# Patient Record
Sex: MALE | Race: BLACK OR AFRICAN AMERICAN | Employment: UNEMPLOYED | URBAN - METROPOLITAN AREA
[De-identification: names, ages, dates, MRNs, and addresses within clinical notes are randomized per-mention and may not be internally consistent; named-entity substitution may affect disease eponyms.]

---

## 2017-05-02 ENCOUNTER — HOSPITAL ENCOUNTER (EMERGENCY)
Age: 32
Discharge: HOME | End: 2017-05-02
Attending: EMERGENCY MEDICINE
Payer: MEDICARE

## 2017-05-02 VITALS
SYSTOLIC BLOOD PRESSURE: 122 MMHG | HEART RATE: 79 BPM | TEMPERATURE: 97.2 F | RESPIRATION RATE: 17 BRPM | DIASTOLIC BLOOD PRESSURE: 67 MMHG | OXYGEN SATURATION: 98 %

## 2017-05-02 DIAGNOSIS — R09.89 CHEST CONGESTION: ICD-10-CM

## 2017-05-02 DIAGNOSIS — J45.901 ASTHMA EXACERBATION: Primary | ICD-10-CM

## 2017-05-02 LAB
POC TROPONIN I: 0.12 NG/ML (ref 0–0.1)
POC TROPONIN INTERP: ABNORMAL
TROPONIN INTERP: NORMAL
TROPONIN T: <0.03 NG/ML

## 2017-05-02 PROCEDURE — 84484 ASSAY OF TROPONIN QUANT: CPT

## 2017-05-02 PROCEDURE — 6370000000 HC RX 637 (ALT 250 FOR IP): Performed by: EMERGENCY MEDICINE

## 2017-05-02 PROCEDURE — 6360000002 HC RX W HCPCS: Performed by: EMERGENCY MEDICINE

## 2017-05-02 PROCEDURE — 94640 AIRWAY INHALATION TREATMENT: CPT

## 2017-05-02 PROCEDURE — 94664 DEMO&/EVAL PT USE INHALER: CPT

## 2017-05-02 PROCEDURE — 93005 ELECTROCARDIOGRAM TRACING: CPT

## 2017-05-02 PROCEDURE — 96374 THER/PROPH/DIAG INJ IV PUSH: CPT

## 2017-05-02 PROCEDURE — 99285 EMERGENCY DEPT VISIT HI MDM: CPT

## 2017-05-02 RX ORDER — AZITHROMYCIN 250 MG/1
500 TABLET, FILM COATED ORAL ONCE
Status: COMPLETED | OUTPATIENT
Start: 2017-05-02 | End: 2017-05-02

## 2017-05-02 RX ORDER — PREDNISONE 10 MG/1
TABLET ORAL
Qty: 20 TABLET | Refills: 0 | Status: SHIPPED | OUTPATIENT
Start: 2017-05-02 | End: 2017-05-12

## 2017-05-02 RX ORDER — AZITHROMYCIN 250 MG/1
TABLET, FILM COATED ORAL
Qty: 1 PACKET | Refills: 0 | Status: SHIPPED | OUTPATIENT
Start: 2017-05-02 | End: 2017-05-12

## 2017-05-02 RX ORDER — PREDNISONE 20 MG/1
40 TABLET ORAL ONCE
Status: DISCONTINUED | OUTPATIENT
Start: 2017-05-02 | End: 2017-05-02

## 2017-05-02 RX ORDER — ALBUTEROL SULFATE 90 UG/1
2 AEROSOL, METERED RESPIRATORY (INHALATION)
Status: DISCONTINUED | OUTPATIENT
Start: 2017-05-02 | End: 2017-05-03 | Stop reason: HOSPADM

## 2017-05-02 RX ORDER — METHYLPREDNISOLONE SODIUM SUCCINATE 125 MG/2ML
125 INJECTION, POWDER, LYOPHILIZED, FOR SOLUTION INTRAMUSCULAR; INTRAVENOUS ONCE
Status: COMPLETED | OUTPATIENT
Start: 2017-05-02 | End: 2017-05-02

## 2017-05-02 RX ADMIN — ALBUTEROL SULFATE 2 PUFF: 90 AEROSOL, METERED RESPIRATORY (INHALATION) at 21:20

## 2017-05-02 RX ADMIN — AZITHROMYCIN 500 MG: 250 TABLET, FILM COATED ORAL at 23:11

## 2017-05-02 RX ADMIN — ALBUTEROL SULFATE 15 MG: 5 SOLUTION RESPIRATORY (INHALATION) at 21:20

## 2017-05-02 RX ADMIN — IPRATROPIUM BROMIDE 0.5 MG: 0.5 SOLUTION RESPIRATORY (INHALATION) at 21:20

## 2017-05-02 RX ADMIN — METHYLPREDNISOLONE SODIUM SUCCINATE 125 MG: 125 INJECTION, POWDER, FOR SOLUTION INTRAMUSCULAR; INTRAVENOUS at 23:11

## 2017-05-02 ASSESSMENT — ENCOUNTER SYMPTOMS
WHEEZING: 1
STRIDOR: 0
ABDOMINAL PAIN: 0
RHINORRHEA: 0
NAUSEA: 0
VOMITING: 0
SHORTNESS OF BREATH: 1

## 2017-05-03 LAB
EKG ATRIAL RATE: 91 BPM
EKG P AXIS: 75 DEGREES
EKG P-R INTERVAL: 138 MS
EKG Q-T INTERVAL: 404 MS
EKG QRS DURATION: 98 MS
EKG QTC CALCULATION (BAZETT): 496 MS
EKG R AXIS: 67 DEGREES
EKG T AXIS: 100 DEGREES
EKG VENTRICULAR RATE: 91 BPM

## 2017-05-21 ENCOUNTER — HOSPITAL ENCOUNTER (EMERGENCY)
Age: 32
Discharge: HOME | End: 2017-05-22
Attending: EMERGENCY MEDICINE
Payer: MEDICARE

## 2017-05-21 VITALS
BODY MASS INDEX: 26.57 KG/M2 | SYSTOLIC BLOOD PRESSURE: 125 MMHG | RESPIRATION RATE: 20 BRPM | TEMPERATURE: 97.2 F | HEIGHT: 77 IN | WEIGHT: 225 LBS | HEART RATE: 94 BPM | OXYGEN SATURATION: 96 % | DIASTOLIC BLOOD PRESSURE: 75 MMHG

## 2017-05-21 DIAGNOSIS — F14.10 COCAINE ABUSE (HCC): ICD-10-CM

## 2017-05-21 DIAGNOSIS — J45.901 ASTHMA EXACERBATION: Primary | ICD-10-CM

## 2017-05-21 PROCEDURE — 99284 EMERGENCY DEPT VISIT MOD MDM: CPT

## 2017-05-21 RX ORDER — ALBUTEROL SULFATE 90 UG/1
2 AEROSOL, METERED RESPIRATORY (INHALATION)
Status: DISCONTINUED | OUTPATIENT
Start: 2017-05-21 | End: 2017-05-22 | Stop reason: HOSPADM

## 2017-05-21 RX ORDER — IPRATROPIUM BROMIDE AND ALBUTEROL SULFATE 2.5; .5 MG/3ML; MG/3ML
1 SOLUTION RESPIRATORY (INHALATION)
Status: DISCONTINUED | OUTPATIENT
Start: 2017-05-21 | End: 2017-05-22 | Stop reason: HOSPADM

## 2017-05-21 RX ORDER — PREDNISONE 20 MG/1
60 TABLET ORAL ONCE
Status: COMPLETED | OUTPATIENT
Start: 2017-05-22 | End: 2017-05-22

## 2017-05-21 RX ORDER — ALBUTEROL SULFATE 2.5 MG/3ML
5 SOLUTION RESPIRATORY (INHALATION)
Status: DISCONTINUED | OUTPATIENT
Start: 2017-05-21 | End: 2017-05-22 | Stop reason: HOSPADM

## 2017-05-22 PROCEDURE — 6370000000 HC RX 637 (ALT 250 FOR IP): Performed by: EMERGENCY MEDICINE

## 2017-05-22 PROCEDURE — 6370000000 HC RX 637 (ALT 250 FOR IP)

## 2017-05-22 RX ORDER — PREDNISONE 50 MG/1
50 TABLET ORAL DAILY
Qty: 5 TABLET | Refills: 0 | Status: SHIPPED | OUTPATIENT
Start: 2017-05-22 | End: 2017-05-27

## 2017-05-22 RX ORDER — ALBUTEROL SULFATE 90 UG/1
1-2 AEROSOL, METERED RESPIRATORY (INHALATION) EVERY 4 HOURS PRN
Qty: 1 INHALER | Refills: 1 | Status: SHIPPED | OUTPATIENT
Start: 2017-05-22 | End: 2017-08-04

## 2017-05-22 RX ORDER — AZITHROMYCIN 250 MG/1
500 TABLET, FILM COATED ORAL ONCE
Status: COMPLETED | OUTPATIENT
Start: 2017-05-22 | End: 2017-05-22

## 2017-05-22 RX ORDER — PREDNISONE 20 MG/1
TABLET ORAL
Status: COMPLETED
Start: 2017-05-22 | End: 2017-05-22

## 2017-05-22 RX ORDER — AZITHROMYCIN 250 MG/1
250 TABLET, FILM COATED ORAL DAILY
Qty: 4 TABLET | Refills: 0 | Status: ON HOLD | OUTPATIENT
Start: 2017-05-22 | End: 2018-10-30 | Stop reason: HOSPADM

## 2017-05-22 RX ADMIN — AZITHROMYCIN 500 MG: 250 TABLET, FILM COATED ORAL at 00:14

## 2017-05-22 RX ADMIN — PREDNISONE 60 MG: 20 TABLET ORAL at 00:13

## 2017-05-22 ASSESSMENT — ENCOUNTER SYMPTOMS
DIARRHEA: 0
BLOOD IN STOOL: 0
ABDOMINAL PAIN: 0
SHORTNESS OF BREATH: 1
RHINORRHEA: 0
APNEA: 0
NAUSEA: 0
SORE THROAT: 0
CHEST TIGHTNESS: 0
BACK PAIN: 0
WHEEZING: 1
VOMITING: 0
COUGH: 1

## 2017-08-03 ENCOUNTER — APPOINTMENT (OUTPATIENT)
Dept: GENERAL RADIOLOGY | Age: 32
End: 2017-08-03
Payer: MEDICARE

## 2017-08-03 ENCOUNTER — HOSPITAL ENCOUNTER (OUTPATIENT)
Age: 32
Setting detail: OBSERVATION
Discharge: HOME | End: 2017-08-04
Attending: EMERGENCY MEDICINE | Admitting: EMERGENCY MEDICINE
Payer: MEDICARE

## 2017-08-03 DIAGNOSIS — R07.9 CHEST PAIN, UNSPECIFIED TYPE: Primary | ICD-10-CM

## 2017-08-03 DIAGNOSIS — N17.9 AKI (ACUTE KIDNEY INJURY) (HCC): ICD-10-CM

## 2017-08-03 LAB
ABSOLUTE EOS #: 0.3 K/UL (ref 0–0.4)
ABSOLUTE LYMPH #: 1.7 K/UL (ref 1–4.8)
ABSOLUTE MONO #: 0.4 K/UL (ref 0.1–1.2)
ANION GAP SERPL CALCULATED.3IONS-SCNC: 21 MMOL/L (ref 9–17)
BASOPHILS # BLD: 1 %
BASOPHILS ABSOLUTE: 0 K/UL (ref 0–0.2)
BNP INTERPRETATION: ABNORMAL
BUN BLDV-MCNC: 10 MG/DL (ref 6–20)
BUN/CREAT BLD: ABNORMAL (ref 9–20)
CALCIUM SERPL-MCNC: 8.8 MG/DL (ref 8.6–10.4)
CHLORIDE BLD-SCNC: 104 MMOL/L (ref 98–107)
CO2: 23 MMOL/L (ref 20–31)
CREAT SERPL-MCNC: 1.31 MG/DL (ref 0.7–1.2)
D-DIMER QUANTITATIVE: 0.24 MG/L FEU
DIFFERENTIAL TYPE: NORMAL
EOSINOPHILS RELATIVE PERCENT: 5 %
GFR AFRICAN AMERICAN: >60 ML/MIN
GFR NON-AFRICAN AMERICAN: >60 ML/MIN
GFR SERPL CREATININE-BSD FRML MDRD: ABNORMAL ML/MIN/{1.73_M2}
GFR SERPL CREATININE-BSD FRML MDRD: ABNORMAL ML/MIN/{1.73_M2}
GLUCOSE BLD-MCNC: 125 MG/DL (ref 70–99)
HCT VFR BLD CALC: 44.6 % (ref 41–53)
HEMOGLOBIN: 14.7 G/DL (ref 13.5–17.5)
LYMPHOCYTES # BLD: 33 %
MCH RBC QN AUTO: 29.9 PG (ref 26–34)
MCHC RBC AUTO-ENTMCNC: 33 G/DL (ref 31–37)
MCV RBC AUTO: 90.4 FL (ref 80–100)
MONOCYTES # BLD: 7 %
PDW BLD-RTO: 13.7 % (ref 12.5–15.4)
PLATELET # BLD: 202 K/UL (ref 140–450)
PLATELET ESTIMATE: NORMAL
PMV BLD AUTO: 10.3 FL (ref 6–12)
POC TROPONIN I: 0.09 NG/ML (ref 0–0.1)
POC TROPONIN INTERP: NORMAL
POTASSIUM SERPL-SCNC: 3.6 MMOL/L (ref 3.7–5.3)
PRO-BNP: 393 PG/ML
RBC # BLD: 4.94 M/UL (ref 4.5–5.9)
RBC # BLD: NORMAL 10*6/UL
SEG NEUTROPHILS: 54 %
SEGMENTED NEUTROPHILS ABSOLUTE COUNT: 2.8 K/UL (ref 1.8–7.7)
SODIUM BLD-SCNC: 148 MMOL/L (ref 135–144)
TROPONIN INTERP: NORMAL
TROPONIN T: <0.03 NG/ML
WBC # BLD: 5.2 K/UL (ref 3.5–11)
WBC # BLD: NORMAL 10*3/UL

## 2017-08-03 PROCEDURE — 94640 AIRWAY INHALATION TREATMENT: CPT

## 2017-08-03 PROCEDURE — 96365 THER/PROPH/DIAG IV INF INIT: CPT

## 2017-08-03 PROCEDURE — 93005 ELECTROCARDIOGRAM TRACING: CPT

## 2017-08-03 PROCEDURE — 36415 COLL VENOUS BLD VENIPUNCTURE: CPT

## 2017-08-03 PROCEDURE — 6370000000 HC RX 637 (ALT 250 FOR IP): Performed by: EMERGENCY MEDICINE

## 2017-08-03 PROCEDURE — 71010 XR CHEST PORTABLE: CPT

## 2017-08-03 PROCEDURE — 96366 THER/PROPH/DIAG IV INF ADDON: CPT

## 2017-08-03 PROCEDURE — 84484 ASSAY OF TROPONIN QUANT: CPT

## 2017-08-03 PROCEDURE — 96375 TX/PRO/DX INJ NEW DRUG ADDON: CPT

## 2017-08-03 PROCEDURE — 83880 ASSAY OF NATRIURETIC PEPTIDE: CPT

## 2017-08-03 PROCEDURE — 6360000002 HC RX W HCPCS: Performed by: EMERGENCY MEDICINE

## 2017-08-03 PROCEDURE — 85379 FIBRIN DEGRADATION QUANT: CPT

## 2017-08-03 PROCEDURE — 99285 EMERGENCY DEPT VISIT HI MDM: CPT

## 2017-08-03 PROCEDURE — 94761 N-INVAS EAR/PLS OXIMETRY MLT: CPT

## 2017-08-03 PROCEDURE — 94664 DEMO&/EVAL PT USE INHALER: CPT

## 2017-08-03 PROCEDURE — 85025 COMPLETE CBC W/AUTO DIFF WBC: CPT

## 2017-08-03 PROCEDURE — 80048 BASIC METABOLIC PNL TOTAL CA: CPT

## 2017-08-03 RX ORDER — ALBUTEROL SULFATE 90 UG/1
2 AEROSOL, METERED RESPIRATORY (INHALATION)
Status: DISCONTINUED | OUTPATIENT
Start: 2017-08-03 | End: 2017-08-04

## 2017-08-03 RX ORDER — METHYLPREDNISOLONE SODIUM SUCCINATE 125 MG/2ML
40 INJECTION, POWDER, LYOPHILIZED, FOR SOLUTION INTRAMUSCULAR; INTRAVENOUS ONCE
Status: COMPLETED | OUTPATIENT
Start: 2017-08-03 | End: 2017-08-03

## 2017-08-03 RX ORDER — MAGNESIUM SULFATE 1 G/100ML
2 INJECTION INTRAVENOUS ONCE
Status: COMPLETED | OUTPATIENT
Start: 2017-08-03 | End: 2017-08-04

## 2017-08-03 RX ADMIN — METHYLPREDNISOLONE SODIUM SUCCINATE 40 MG: 125 INJECTION, POWDER, FOR SOLUTION INTRAMUSCULAR; INTRAVENOUS at 22:22

## 2017-08-03 RX ADMIN — IPRATROPIUM BROMIDE 0.5 MG: 0.5 SOLUTION RESPIRATORY (INHALATION) at 22:05

## 2017-08-03 RX ADMIN — MAGNESIUM SULFATE IN DEXTROSE 2 G: 10 INJECTION, SOLUTION INTRAVENOUS at 22:23

## 2017-08-03 RX ADMIN — ALBUTEROL SULFATE 20 MG: 5 SOLUTION RESPIRATORY (INHALATION) at 22:05

## 2017-08-03 RX ADMIN — ALBUTEROL SULFATE 15 MG: 5 SOLUTION RESPIRATORY (INHALATION) at 23:07

## 2017-08-03 RX ADMIN — ALBUTEROL SULFATE 2 PUFF: 90 AEROSOL, METERED RESPIRATORY (INHALATION) at 22:05

## 2017-08-04 VITALS
OXYGEN SATURATION: 95 % | HEART RATE: 101 BPM | DIASTOLIC BLOOD PRESSURE: 78 MMHG | BODY MASS INDEX: 25.98 KG/M2 | RESPIRATION RATE: 20 BRPM | HEIGHT: 77 IN | WEIGHT: 220 LBS | TEMPERATURE: 97 F | SYSTOLIC BLOOD PRESSURE: 136 MMHG

## 2017-08-04 LAB
EKG ATRIAL RATE: 85 BPM
EKG ATRIAL RATE: 89 BPM
EKG P AXIS: 73 DEGREES
EKG P AXIS: 76 DEGREES
EKG P-R INTERVAL: 148 MS
EKG P-R INTERVAL: 152 MS
EKG Q-T INTERVAL: 392 MS
EKG Q-T INTERVAL: 414 MS
EKG QRS DURATION: 102 MS
EKG QRS DURATION: 102 MS
EKG QTC CALCULATION (BAZETT): 466 MS
EKG QTC CALCULATION (BAZETT): 503 MS
EKG R AXIS: 62 DEGREES
EKG R AXIS: 64 DEGREES
EKG T AXIS: 109 DEGREES
EKG T AXIS: 97 DEGREES
EKG VENTRICULAR RATE: 85 BPM
EKG VENTRICULAR RATE: 89 BPM
TROPONIN INTERP: NORMAL
TROPONIN INTERP: NORMAL
TROPONIN T: <0.03 NG/ML
TROPONIN T: <0.03 NG/ML

## 2017-08-04 PROCEDURE — 6370000000 HC RX 637 (ALT 250 FOR IP): Performed by: EMERGENCY MEDICINE

## 2017-08-04 PROCEDURE — 6360000002 HC RX W HCPCS: Performed by: STUDENT IN AN ORGANIZED HEALTH CARE EDUCATION/TRAINING PROGRAM

## 2017-08-04 PROCEDURE — 93005 ELECTROCARDIOGRAM TRACING: CPT

## 2017-08-04 PROCEDURE — G0378 HOSPITAL OBSERVATION PER HR: HCPCS

## 2017-08-04 PROCEDURE — 84484 ASSAY OF TROPONIN QUANT: CPT

## 2017-08-04 PROCEDURE — 6370000000 HC RX 637 (ALT 250 FOR IP): Performed by: STUDENT IN AN ORGANIZED HEALTH CARE EDUCATION/TRAINING PROGRAM

## 2017-08-04 PROCEDURE — 94640 AIRWAY INHALATION TREATMENT: CPT

## 2017-08-04 PROCEDURE — 36415 COLL VENOUS BLD VENIPUNCTURE: CPT

## 2017-08-04 PROCEDURE — 2580000003 HC RX 258: Performed by: EMERGENCY MEDICINE

## 2017-08-04 RX ORDER — PREDNISONE 50 MG/1
50 TABLET ORAL DAILY
Qty: 4 TABLET | Refills: 0 | Status: ON HOLD | OUTPATIENT
Start: 2017-08-04 | End: 2018-10-30 | Stop reason: HOSPADM

## 2017-08-04 RX ORDER — SODIUM CHLORIDE 0.9 % (FLUSH) 0.9 %
10 SYRINGE (ML) INJECTION EVERY 12 HOURS SCHEDULED
Status: DISCONTINUED | OUTPATIENT
Start: 2017-08-04 | End: 2017-08-04 | Stop reason: HOSPADM

## 2017-08-04 RX ORDER — ALBUTEROL SULFATE 2.5 MG/3ML
2.5 SOLUTION RESPIRATORY (INHALATION) EVERY 4 HOURS PRN
Status: DISCONTINUED | OUTPATIENT
Start: 2017-08-04 | End: 2017-08-04 | Stop reason: HOSPADM

## 2017-08-04 RX ORDER — SODIUM CHLORIDE 0.9 % (FLUSH) 0.9 %
10 SYRINGE (ML) INJECTION PRN
Status: DISCONTINUED | OUTPATIENT
Start: 2017-08-04 | End: 2017-08-04 | Stop reason: HOSPADM

## 2017-08-04 RX ORDER — ACETAMINOPHEN 325 MG/1
650 TABLET ORAL EVERY 4 HOURS PRN
Status: DISCONTINUED | OUTPATIENT
Start: 2017-08-04 | End: 2017-08-04 | Stop reason: HOSPADM

## 2017-08-04 RX ORDER — VERAPAMIL HYDROCHLORIDE 240 MG/1
240 TABLET, FILM COATED, EXTENDED RELEASE ORAL DAILY
Qty: 30 TABLET | Refills: 3 | Status: ON HOLD | OUTPATIENT
Start: 2017-08-04 | End: 2018-10-30

## 2017-08-04 RX ORDER — ALBUTEROL SULFATE 90 UG/1
2 AEROSOL, METERED RESPIRATORY (INHALATION) EVERY 6 HOURS PRN
Status: DISCONTINUED | OUTPATIENT
Start: 2017-08-04 | End: 2017-08-04 | Stop reason: HOSPADM

## 2017-08-04 RX ORDER — ASPIRIN 81 MG/1
81 TABLET, CHEWABLE ORAL DAILY
Qty: 30 TABLET | Refills: 0 | Status: ON HOLD | OUTPATIENT
Start: 2017-08-04 | End: 2018-10-30

## 2017-08-04 RX ORDER — VERAPAMIL HYDROCHLORIDE 240 MG/1
240 TABLET, FILM COATED, EXTENDED RELEASE ORAL DAILY
Status: DISCONTINUED | OUTPATIENT
Start: 2017-08-04 | End: 2017-08-04 | Stop reason: HOSPADM

## 2017-08-04 RX ORDER — GABAPENTIN 100 MG/1
200 CAPSULE ORAL NIGHTLY
Qty: 90 CAPSULE | Refills: 3 | Status: ON HOLD | OUTPATIENT
Start: 2017-08-04 | End: 2018-10-30 | Stop reason: HOSPADM

## 2017-08-04 RX ORDER — GABAPENTIN 100 MG/1
200 CAPSULE ORAL NIGHTLY
Status: DISCONTINUED | OUTPATIENT
Start: 2017-08-04 | End: 2017-08-04 | Stop reason: HOSPADM

## 2017-08-04 RX ORDER — ASPIRIN 81 MG/1
81 TABLET, CHEWABLE ORAL DAILY
Status: DISCONTINUED | OUTPATIENT
Start: 2017-08-04 | End: 2017-08-04 | Stop reason: HOSPADM

## 2017-08-04 RX ORDER — ASPIRIN 81 MG/1
81 TABLET, CHEWABLE ORAL DAILY
Qty: 30 TABLET | Refills: 3 | Status: ON HOLD | OUTPATIENT
Start: 2017-08-04 | End: 2017-11-21 | Stop reason: HOSPADM

## 2017-08-04 RX ORDER — 0.9 % SODIUM CHLORIDE 0.9 %
1000 INTRAVENOUS SOLUTION INTRAVENOUS ONCE
Status: COMPLETED | OUTPATIENT
Start: 2017-08-04 | End: 2017-08-04

## 2017-08-04 RX ORDER — ALBUTEROL SULFATE 90 UG/1
1-2 AEROSOL, METERED RESPIRATORY (INHALATION) EVERY 4 HOURS PRN
Qty: 1 INHALER | Refills: 1 | Status: SHIPPED | OUTPATIENT
Start: 2017-08-04 | End: 2018-11-05 | Stop reason: SDUPTHER

## 2017-08-04 RX ORDER — CETIRIZINE HYDROCHLORIDE 10 MG/1
10 TABLET ORAL DAILY
Qty: 30 TABLET | Refills: 0 | Status: ON HOLD | OUTPATIENT
Start: 2017-08-04 | End: 2018-10-30

## 2017-08-04 RX ADMIN — VERAPAMIL HYDROCHLORIDE 240 MG: 240 TABLET, FILM COATED, EXTENDED RELEASE ORAL at 09:54

## 2017-08-04 RX ADMIN — ASPIRIN 81 MG: 81 TABLET, CHEWABLE ORAL at 09:54

## 2017-08-04 RX ADMIN — ALBUTEROL SULFATE 2.5 MG: 2.5 SOLUTION RESPIRATORY (INHALATION) at 08:05

## 2017-08-04 RX ADMIN — SODIUM CHLORIDE 1000 ML: 9 INJECTION, SOLUTION INTRAVENOUS at 01:36

## 2017-08-04 RX ADMIN — BECLOMETHASONE DIPROPIONATE 2 PUFF: 40 AEROSOL, METERED RESPIRATORY (INHALATION) at 11:59

## 2017-08-04 ASSESSMENT — HEART SCORE: ECG: 1

## 2017-08-04 ASSESSMENT — ENCOUNTER SYMPTOMS
SHORTNESS OF BREATH: 1
COUGH: 1
NAUSEA: 0
VOMITING: 0
ABDOMINAL PAIN: 0

## 2017-11-21 ENCOUNTER — HOSPITAL ENCOUNTER (INPATIENT)
Age: 32
LOS: 1 days | Discharge: HOME | DRG: 198 | End: 2017-11-21
Attending: EMERGENCY MEDICINE | Admitting: INTERNAL MEDICINE
Payer: MEDICARE

## 2017-11-21 ENCOUNTER — APPOINTMENT (OUTPATIENT)
Dept: GENERAL RADIOLOGY | Age: 32
DRG: 198 | End: 2017-11-21
Payer: MEDICARE

## 2017-11-21 VITALS
TEMPERATURE: 98.1 F | OXYGEN SATURATION: 95 % | HEART RATE: 79 BPM | WEIGHT: 222 LBS | DIASTOLIC BLOOD PRESSURE: 73 MMHG | RESPIRATION RATE: 16 BRPM | SYSTOLIC BLOOD PRESSURE: 145 MMHG | BODY MASS INDEX: 26.33 KG/M2

## 2017-11-21 DIAGNOSIS — I42.1 HOCM (HYPERTROPHIC OBSTRUCTIVE CARDIOMYOPATHY) (HCC): ICD-10-CM

## 2017-11-21 DIAGNOSIS — F17.200 SMOKER: ICD-10-CM

## 2017-11-21 DIAGNOSIS — R07.9 CHEST PAIN, UNSPECIFIED TYPE: Primary | ICD-10-CM

## 2017-11-21 DIAGNOSIS — F14.10 NONDEPENDENT COCAINE ABUSE (HCC): ICD-10-CM

## 2017-11-21 PROBLEM — J45.20 MILD INTERMITTENT ASTHMA WITHOUT COMPLICATION: Status: ACTIVE | Noted: 2017-11-21

## 2017-11-21 PROBLEM — Z91.199 NONCOMPLIANCE: Status: ACTIVE | Noted: 2017-11-21

## 2017-11-21 LAB
ABSOLUTE EOS #: 0.24 K/UL (ref 0–0.4)
ABSOLUTE IMMATURE GRANULOCYTE: 0 K/UL (ref 0–0.3)
ABSOLUTE LYMPH #: 3.18 K/UL (ref 1–4.8)
ABSOLUTE MONO #: 0.47 K/UL (ref 0.1–0.8)
ANION GAP SERPL CALCULATED.3IONS-SCNC: 13 MMOL/L (ref 9–17)
BASOPHILS # BLD: 0 %
BASOPHILS ABSOLUTE: 0 K/UL (ref 0–0.2)
BUN BLDV-MCNC: 9 MG/DL (ref 6–20)
BUN/CREAT BLD: ABNORMAL (ref 9–20)
CALCIUM SERPL-MCNC: 9.1 MG/DL (ref 8.6–10.4)
CHLORIDE BLD-SCNC: 103 MMOL/L (ref 98–107)
CO2: 22 MMOL/L (ref 20–31)
CREAT SERPL-MCNC: 1.29 MG/DL (ref 0.7–1.2)
DIFFERENTIAL TYPE: ABNORMAL
EKG ATRIAL RATE: 85 BPM
EKG P AXIS: 75 DEGREES
EKG P-R INTERVAL: 146 MS
EKG Q-T INTERVAL: 424 MS
EKG QRS DURATION: 100 MS
EKG QTC CALCULATION (BAZETT): 504 MS
EKG R AXIS: 61 DEGREES
EKG T AXIS: 85 DEGREES
EKG VENTRICULAR RATE: 85 BPM
EOSINOPHILS RELATIVE PERCENT: 4 %
GFR AFRICAN AMERICAN: >60 ML/MIN
GFR NON-AFRICAN AMERICAN: >60 ML/MIN
GFR SERPL CREATININE-BSD FRML MDRD: ABNORMAL ML/MIN/{1.73_M2}
GFR SERPL CREATININE-BSD FRML MDRD: ABNORMAL ML/MIN/{1.73_M2}
GLUCOSE BLD-MCNC: 114 MG/DL (ref 70–99)
HCT VFR BLD CALC: 50.5 % (ref 40.7–50.3)
HEMOGLOBIN: 16.6 G/DL (ref 13–17)
IMMATURE GRANULOCYTES: 0 %
LYMPHOCYTES # BLD: 54 %
MAGNESIUM: 2 MG/DL (ref 1.6–2.6)
MCH RBC QN AUTO: 30.1 PG (ref 25.2–33.5)
MCHC RBC AUTO-ENTMCNC: 32.9 G/DL (ref 28.4–34.8)
MCV RBC AUTO: 91.5 FL (ref 82.6–102.9)
MONOCYTES # BLD: 8 %
MORPHOLOGY: NORMAL
PDW BLD-RTO: 12.5 % (ref 11.8–14.4)
PLATELET # BLD: ABNORMAL K/UL (ref 138–453)
PLATELET ESTIMATE: ABNORMAL
PLATELET, FLUORESCENCE: NORMAL K/UL (ref 138–453)
PLATELET, IMMATURE FRACTION: NORMAL % (ref 1.1–10.3)
PMV BLD AUTO: ABNORMAL FL (ref 8.1–13.5)
POC TROPONIN I: 0.08 NG/ML (ref 0–0.1)
POC TROPONIN I: 0.13 NG/ML (ref 0–0.1)
POC TROPONIN INTERP: ABNORMAL
POC TROPONIN INTERP: NORMAL
POTASSIUM SERPL-SCNC: 3.9 MMOL/L (ref 3.7–5.3)
RBC # BLD: 5.52 M/UL (ref 4.21–5.77)
RBC # BLD: ABNORMAL 10*6/UL
SEG NEUTROPHILS: 34 %
SEGMENTED NEUTROPHILS ABSOLUTE COUNT: 2.01 K/UL (ref 1.8–7.7)
SODIUM BLD-SCNC: 138 MMOL/L (ref 135–144)
TROPONIN INTERP: NORMAL
TROPONIN T: <0.03 NG/ML
WBC # BLD: 5.9 K/UL (ref 3.5–11.3)
WBC # BLD: ABNORMAL 10*3/UL

## 2017-11-21 PROCEDURE — 2060000000 HC ICU INTERMEDIATE R&B

## 2017-11-21 PROCEDURE — 6370000000 HC RX 637 (ALT 250 FOR IP): Performed by: NURSE PRACTITIONER

## 2017-11-21 PROCEDURE — 99285 EMERGENCY DEPT VISIT HI MDM: CPT

## 2017-11-21 PROCEDURE — 2580000003 HC RX 258: Performed by: EMERGENCY MEDICINE

## 2017-11-21 PROCEDURE — 94640 AIRWAY INHALATION TREATMENT: CPT

## 2017-11-21 PROCEDURE — 6360000002 HC RX W HCPCS: Performed by: NURSE PRACTITIONER

## 2017-11-21 PROCEDURE — 6360000002 HC RX W HCPCS: Performed by: EMERGENCY MEDICINE

## 2017-11-21 PROCEDURE — 6370000000 HC RX 637 (ALT 250 FOR IP): Performed by: EMERGENCY MEDICINE

## 2017-11-21 PROCEDURE — 84484 ASSAY OF TROPONIN QUANT: CPT

## 2017-11-21 PROCEDURE — 80048 BASIC METABOLIC PNL TOTAL CA: CPT

## 2017-11-21 PROCEDURE — 76937 US GUIDE VASCULAR ACCESS: CPT

## 2017-11-21 PROCEDURE — 85025 COMPLETE CBC W/AUTO DIFF WBC: CPT

## 2017-11-21 PROCEDURE — 2580000003 HC RX 258: Performed by: INTERNAL MEDICINE

## 2017-11-21 PROCEDURE — 71010 XR CHEST PORTABLE: CPT

## 2017-11-21 PROCEDURE — 94762 N-INVAS EAR/PLS OXIMTRY CONT: CPT

## 2017-11-21 PROCEDURE — 83735 ASSAY OF MAGNESIUM: CPT

## 2017-11-21 PROCEDURE — 36415 COLL VENOUS BLD VENIPUNCTURE: CPT

## 2017-11-21 PROCEDURE — 96374 THER/PROPH/DIAG INJ IV PUSH: CPT

## 2017-11-21 PROCEDURE — 93005 ELECTROCARDIOGRAM TRACING: CPT

## 2017-11-21 PROCEDURE — 99235 HOSP IP/OBS SAME DATE MOD 70: CPT | Performed by: INTERNAL MEDICINE

## 2017-11-21 PROCEDURE — 2580000003 HC RX 258: Performed by: NURSE PRACTITIONER

## 2017-11-21 RX ORDER — SODIUM CHLORIDE 0.9 % (FLUSH) 0.9 %
10 SYRINGE (ML) INJECTION PRN
Status: DISCONTINUED | OUTPATIENT
Start: 2017-11-21 | End: 2017-11-22 | Stop reason: HOSPADM

## 2017-11-21 RX ORDER — POTASSIUM CHLORIDE 7.45 MG/ML
10 INJECTION INTRAVENOUS PRN
Status: DISCONTINUED | OUTPATIENT
Start: 2017-11-21 | End: 2017-11-22 | Stop reason: HOSPADM

## 2017-11-21 RX ORDER — NITROGLYCERIN 0.4 MG/1
0.4 TABLET SUBLINGUAL EVERY 5 MIN PRN
Status: DISCONTINUED | OUTPATIENT
Start: 2017-11-21 | End: 2017-11-22 | Stop reason: HOSPADM

## 2017-11-21 RX ORDER — MORPHINE SULFATE 4 MG/ML
4 INJECTION, SOLUTION INTRAMUSCULAR; INTRAVENOUS
Status: DISCONTINUED | OUTPATIENT
Start: 2017-11-21 | End: 2017-11-22 | Stop reason: HOSPADM

## 2017-11-21 RX ORDER — BISACODYL 10 MG
10 SUPPOSITORY, RECTAL RECTAL DAILY PRN
Status: DISCONTINUED | OUTPATIENT
Start: 2017-11-21 | End: 2017-11-22 | Stop reason: HOSPADM

## 2017-11-21 RX ORDER — SODIUM CHLORIDE 0.9 % (FLUSH) 0.9 %
10 SYRINGE (ML) INJECTION EVERY 12 HOURS SCHEDULED
Status: DISCONTINUED | OUTPATIENT
Start: 2017-11-21 | End: 2017-11-22 | Stop reason: HOSPADM

## 2017-11-21 RX ORDER — MORPHINE SULFATE 2 MG/ML
2 INJECTION, SOLUTION INTRAMUSCULAR; INTRAVENOUS
Status: DISCONTINUED | OUTPATIENT
Start: 2017-11-21 | End: 2017-11-22 | Stop reason: HOSPADM

## 2017-11-21 RX ORDER — ONDANSETRON 2 MG/ML
4 INJECTION INTRAMUSCULAR; INTRAVENOUS EVERY 6 HOURS PRN
Status: DISCONTINUED | OUTPATIENT
Start: 2017-11-21 | End: 2017-11-22 | Stop reason: HOSPADM

## 2017-11-21 RX ORDER — CETIRIZINE HYDROCHLORIDE 10 MG/1
10 TABLET ORAL DAILY
Status: DISCONTINUED | OUTPATIENT
Start: 2017-11-21 | End: 2017-11-22 | Stop reason: HOSPADM

## 2017-11-21 RX ORDER — LIDOCAINE HYDROCHLORIDE 10 MG/ML
5 INJECTION, SOLUTION INFILTRATION; PERINEURAL ONCE
Status: DISCONTINUED | OUTPATIENT
Start: 2017-11-21 | End: 2017-11-22 | Stop reason: HOSPADM

## 2017-11-21 RX ORDER — DOCUSATE SODIUM 100 MG/1
100 CAPSULE, LIQUID FILLED ORAL 2 TIMES DAILY
Status: DISCONTINUED | OUTPATIENT
Start: 2017-11-21 | End: 2017-11-22 | Stop reason: HOSPADM

## 2017-11-21 RX ORDER — ASPIRIN 81 MG/1
324 TABLET, CHEWABLE ORAL ONCE
Status: COMPLETED | OUTPATIENT
Start: 2017-11-21 | End: 2017-11-21

## 2017-11-21 RX ORDER — LORAZEPAM 2 MG/ML
1 INJECTION INTRAMUSCULAR ONCE
Status: COMPLETED | OUTPATIENT
Start: 2017-11-21 | End: 2017-11-21

## 2017-11-21 RX ORDER — ACETAMINOPHEN 325 MG/1
650 TABLET ORAL EVERY 4 HOURS PRN
Status: DISCONTINUED | OUTPATIENT
Start: 2017-11-21 | End: 2017-11-22 | Stop reason: HOSPADM

## 2017-11-21 RX ORDER — GABAPENTIN 100 MG/1
200 CAPSULE ORAL NIGHTLY
Status: DISCONTINUED | OUTPATIENT
Start: 2017-11-21 | End: 2017-11-22 | Stop reason: HOSPADM

## 2017-11-21 RX ORDER — PREDNISONE 50 MG/1
50 TABLET ORAL DAILY
Status: DISCONTINUED | OUTPATIENT
Start: 2017-11-21 | End: 2017-11-22 | Stop reason: HOSPADM

## 2017-11-21 RX ORDER — SODIUM CHLORIDE 9 MG/ML
INJECTION, SOLUTION INTRAVENOUS CONTINUOUS
Status: DISCONTINUED | OUTPATIENT
Start: 2017-11-21 | End: 2017-11-22 | Stop reason: HOSPADM

## 2017-11-21 RX ORDER — POTASSIUM CHLORIDE 20 MEQ/1
40 TABLET, EXTENDED RELEASE ORAL PRN
Status: DISCONTINUED | OUTPATIENT
Start: 2017-11-21 | End: 2017-11-22 | Stop reason: HOSPADM

## 2017-11-21 RX ORDER — VERAPAMIL HYDROCHLORIDE 240 MG/1
240 TABLET, FILM COATED, EXTENDED RELEASE ORAL DAILY
Status: DISCONTINUED | OUTPATIENT
Start: 2017-11-21 | End: 2017-11-22 | Stop reason: HOSPADM

## 2017-11-21 RX ORDER — MAGNESIUM SULFATE 1 G/100ML
1 INJECTION INTRAVENOUS PRN
Status: DISCONTINUED | OUTPATIENT
Start: 2017-11-21 | End: 2017-11-22 | Stop reason: HOSPADM

## 2017-11-21 RX ORDER — POTASSIUM CHLORIDE 20MEQ/15ML
40 LIQUID (ML) ORAL PRN
Status: DISCONTINUED | OUTPATIENT
Start: 2017-11-21 | End: 2017-11-22 | Stop reason: HOSPADM

## 2017-11-21 RX ORDER — ASPIRIN 81 MG/1
81 TABLET ORAL DAILY
Status: DISCONTINUED | OUTPATIENT
Start: 2017-11-22 | End: 2017-11-22 | Stop reason: HOSPADM

## 2017-11-21 RX ADMIN — SODIUM CHLORIDE, PRESERVATIVE FREE 10 ML: 5 INJECTION INTRAVENOUS at 10:30

## 2017-11-21 RX ADMIN — PREDNISONE 50 MG: 50 TABLET ORAL at 10:57

## 2017-11-21 RX ADMIN — VERAPAMIL HYDROCHLORIDE 240 MG: 240 TABLET, FILM COATED, EXTENDED RELEASE ORAL at 10:52

## 2017-11-21 RX ADMIN — CETIRIZINE HYDROCHLORIDE 10 MG: 10 TABLET ORAL at 10:52

## 2017-11-21 RX ADMIN — SODIUM CHLORIDE: 9 INJECTION, SOLUTION INTRAVENOUS at 10:51

## 2017-11-21 RX ADMIN — DOCUSATE SODIUM 100 MG: 100 CAPSULE ORAL at 10:57

## 2017-11-21 RX ADMIN — BECLOMETHASONE DIPROPIONATE 2 PUFF: 40 AEROSOL, METERED RESPIRATORY (INHALATION) at 09:46

## 2017-11-21 RX ADMIN — ASPIRIN 81 MG 324 MG: 81 TABLET ORAL at 05:02

## 2017-11-21 RX ADMIN — GABAPENTIN 200 MG: 100 CAPSULE ORAL at 21:00

## 2017-11-21 RX ADMIN — LORAZEPAM 1 MG: 2 INJECTION INTRAMUSCULAR; INTRAVENOUS at 05:03

## 2017-11-21 RX ADMIN — BECLOMETHASONE DIPROPIONATE 2 PUFF: 40 AEROSOL, METERED RESPIRATORY (INHALATION) at 20:52

## 2017-11-21 ASSESSMENT — ENCOUNTER SYMPTOMS
BLOOD IN STOOL: 0
SPUTUM PRODUCTION: 0
NAUSEA: 0
COUGH: 0
WHEEZING: 0
BLURRED VISION: 0
VOMITING: 0
DOUBLE VISION: 0
SHORTNESS OF BREATH: 0

## 2017-11-21 ASSESSMENT — PAIN SCALES - GENERAL
PAINLEVEL_OUTOF10: 5
PAINLEVEL_OUTOF10: 1
PAINLEVEL_OUTOF10: 0

## 2017-11-21 ASSESSMENT — PAIN DESCRIPTION - LOCATION
LOCATION: CHEST
LOCATION: CHEST

## 2017-11-21 ASSESSMENT — PAIN DESCRIPTION - DESCRIPTORS
DESCRIPTORS: TIGHTNESS
DESCRIPTORS: PATIENT UNABLE TO DESCRIBE

## 2017-11-21 ASSESSMENT — PAIN DESCRIPTION - ORIENTATION: ORIENTATION: MID;LEFT

## 2017-11-21 NOTE — PROGRESS NOTES
Cardiac Testing      ECHO 7/6/16: EF 60%, HOCM, grade II DD, mild MR.      CATH 1/6/16: Normal coronaries. EF 70%.

## 2017-11-21 NOTE — PLAN OF CARE
Problem: Falls - Risk of  Goal: Absence of falls  Outcome: Ongoing  Pt remains free of falls, wearing non skid socks, fall sign posted, pt oriented to room, pt uses call light when wanting to get up.

## 2017-11-21 NOTE — ED PROVIDER NOTES
[facility]     Emergency Department     Faculty Attestation    I performed a history and physical examination of the patient and discussed management with the resident. I reviewed the residents note and agree with the documented findings including all diagnostic interpretations and plan of care. Any areas of disagreement are noted on the chart. I was personally present for the key portions of any procedures. I have documented in the chart those procedures where I was not present during the key portions. I have reviewed the emergency nurses triage note. I agree with the chief complaint, past medical history, past surgical history, allergies, medications, social and family history as documented unless otherwise noted below. Documentation of the HPI, Physical Exam and Medical Decision Making performed by gomezibpa is based on my personal performance of the HPI, PE and MDM. For Physician Assistant/ Nurse Practitioner cases/documentation I have personally evaluated this patient and have completed at least one if not all key elements of the E/M (history, physical exam, and MDM). Additional findings are as noted. Primary Care Physician: No primary care provider on file. History: This is a 28 y.o. male who presents to the Emergency Department with complaint of chest pain. Patient reports chest pain after using cocaine. History of HOCM. follows with Dr. Matthew Ariza. Does not take his verapamil. Physical:     weight is 222 lb (100.7 kg). His oral temperature is 98.1 °F (36.7 °C). His blood pressure is 135/81 and his pulse is 78. His respiration is 17 and oxygen saturation is 98%.    28 y.o. male no acute distress, cardiac exam regular rate and rhythm, there is a systolic murmur on examination, no rubs or gallops, pulmonary clear to auscultation bilaterally, abdomen is soft nontender nondistended, radial pulses 2+ bilaterally.     Impression: Chest pain, cocaine abuse, history of HOCM    Plan: Ativan, cardiac workup, admission      Pre-hypertension/Hypertension: The patient has been informed that they may have pre-hypertension or Hypertension based on a blood pressure reading in the emergency department. I recommend that the patient call the primary care provider listed on their discharge instructions or a physician of their choice this week to arrange follow up for further evaluation of possible pre-hypertension or Hypertension.       EKG Interpretation  EKG Interpretation    Interpreted by emergency department physician    Rhythm: normal sinus   Rate: normal  Axis: normal  Ectopy: none  Conduction: Biatrial enlargement, nonspecific borderline interventricular conduction delay, ventricular hypertrophy, , , QTc 504  ST Segments: no acute change  T Waves: no acute change  Q Waves: none    EKG  Impression: Abnormal EKG, no acute change    Johnston Nissen, MD      Interpreted by me      Mily Lloyd MD  Attending Emergency Physician        Johnston Nissen, MD  11/21/17 1270

## 2017-11-21 NOTE — ED PROVIDER NOTES
[facility] ED  Emergency Department Encounter  Emergency Medicine Resident     Pt Name: Mony Lainez  MRN: [id number]  [place] 1985  Date of evaluation: 11/21/17  PCP:  No primary care provider on file. CHIEF COMPLAINT       Chief Complaint   Patient presents with    Chest Pain       HISTORY OF PRESENT ILLNESS  (Location/Symptom, Timing/Onset, Context/Setting, Quality, Duration, Modifying Factors, Severity.)      Mony Lainez is a 28 y.o. male who presents with acute   Left sided chest pain that has been on going since his cocaine abuse last used three hours pta. no radiation. It is sharp and last trasnitently and not aggravated or relieved by anything including medications, position or breathing. Nothing been given. Patient does not have adequate pain control. Noncompliant with verapamil     ECHO 7/6/16: EF 60%, HOCM, grade II DD, mild MR.      CATH 1/6/16: Normal coronaries. EF 70%    Associated symptoms:  Nausea/Vomitting no  Diaphoresis no  F/C:no  Productive cough: no although patient has a history of asthma        Pt denied his CP being sudden onset ripping, tearing, and radiating to the back. Pt denied any recent surgeries, trauma, deep vein thrombosis, pulmonary embolisms, history of cancer, hormone use,  unilateral leg swelling, travel    PAST MEDICAL / SURGICAL / SOCIAL / FAMILY HISTORY      has a past medical history of DI (acute kidney injury) ([address] Asthma; Cardiomyopathy ([address] Cocaine abuse; Foreign body in ear; Heart attack ([address] Hypertension; Hypokalemia; and Substance abuse.     has no past surgical history on file.     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 [employer]     Social History Main Topics    Smoking status: Current Every Day Smoker     Packs/day: 1.00     Years: 12.00     Types: Cigarettes    Smokeless tobacco: Not on file    Alcohol use Yes      Comment: drinks socially    Drug use:      Types: Marijuana, Cocaine      Comment: last used on 5/19    Sexual activity: Yes     Other Topics Concern    Not on file     Social History Narrative    No narrative on file       Family History   Problem Relation Age of Onset    Diabetes Other     Hypertension Other     Heart Disease Other     Diabetes Mother        Allergies:  Epinephrine; Niacin and related; and Seasonal    Home Medications:  Prior to Admission medications    Medication Sig Start Date End Date Taking? Authorizing Provider   predniSONE (DELTASONE) 50 MG tablet Take 1 tablet by mouth daily 8/4/17   Alan Maravilla MD   albuterol sulfate HFA (PROVENTIL HFA) 108 (90 Base) MCG/ACT inhaler Inhale 1-2 puffs into the lungs every 4 hours as needed for Wheezing or Shortness of Breath Space out to every 6 hours as symptoms improve.  8/4/17   Alan Maravilla MD   aspirin 81 MG chewable tablet Take 1 tablet by mouth daily 8/4/17   Alan Maravilla MD   aspirin 81 MG chewable tablet Take 1 tablet by mouth daily 8/4/17   Alan Maravilla MD   beclomethasone (QVAR) 40 MCG/ACT inhaler Inhale 2 puffs into the lungs 2 times daily 8/4/17   Alan Maravilla MD   cetirizine (ZYRTEC ALLERGY) 10 MG tablet Take 1 tablet by mouth daily 8/4/17   Alan Maravilla MD   gabapentin (NEURONTIN) 100 MG capsule Take 2 capsules by mouth nightly 8/4/17   Alan Maravilla MD   verapamil (CALAN SR) 240 MG extended release tablet Take 1 tablet by mouth daily 8/4/17   Alan Maravilla MD   azithromycin (ZITHROMAX) 250 MG tablet Take 1 tablet by mouth daily 5/22/17   [facility] DO Ene   gabapentin (NEURONTIN) 100 MG capsule Take 200 mg by mouth nightly    Historical Provider, MD   verapamil (CALAN-SR) 240 MG CR tablet Take 1 tablet by mouth daily 7/1/16   Isai Carter DO   beclomethasone (QVAR) 40 MCG/ACT inhaler Inhale 2 puffs into the lungs 2 times daily 7/1/16   Isai Carter DO       REVIEW OF SYSTEMS Insert peripheral IV    PATIENT STATUS (FROM ED OR OR/PROCEDURAL) Inpatient       MEDICATIONS ORDERED:  Orders Placed This Encounter   Medications    aspirin chewable tablet 324 mg    LORazepam (ATIVAN) injection 1 mg       DDX: ACS, tension pneumothorax pulmonary embolism aortic dissection esophageal rupture.     DIAGNOSTIC RESULTS / EMERGENCY DEPARTMENT COURSE / MDM     LABS:  Results for orders placed or performed during the hospital encounter of 11/21/17   CBC Auto Differential   Result Value Ref Range    WBC 5.9 3.5 - 11.3 k/uL    RBC 5.52 4.21 - 5.77 m/uL    Hemoglobin 16.6 13.0 - 17.0 g/dL    Hematocrit 50.5 (H) 40.7 - 50.3 %    MCV 91.5 82.6 - 102.9 fL    MCH 30.1 25.2 - 33.5 pg    MCHC 32.9 28.4 - 34.8 g/dL    RDW 12.5 11.8 - 14.4 %    Platelets See Reflexed IPF Result 138 - 453 k/uL    MPV NOT REPORTED 8.1 - 13.5 fL    Differential Type NOT REPORTED     WBC Morphology NOT REPORTED     RBC Morphology NOT REPORTED     Platelet Estimate NOT REPORTED     Immature Granulocytes 0 0 %    Seg Neutrophils 34 %    Lymphocytes 54 %    Monocytes 8 %    Eosinophils % 4 %    Basophils 0 %    Absolute Immature Granulocyte 0.00 0.00 - 0.30 k/uL    Segs Absolute 2.01 1.8 - 7.7 k/uL    Absolute Lymph # 3.18 1.0 - 4.8 k/uL    Absolute Mono # 0.47 0.1 - 0.8 k/uL    Absolute Eos # 0.24 0.0 - 0.4 k/uL    Basophils # 0.00 0.0 - 0.2 k/uL    Morphology Normal    Basic Metabolic Panel   Result Value Ref Range    Glucose 114 (H) 70 - 99 mg/dL    BUN 9 6 - 20 mg/dL    CREATININE 1.29 (H) 0.70 - 1.20 mg/dL    Bun/Cre Ratio NOT REPORTED 9 - 20    Calcium 9.1 8.6 - 10.4 mg/dL    Sodium 138 135 - 144 mmol/L    Potassium 3.9 3.7 - 5.3 mmol/L    Chloride 103 98 - 107 mmol/L    CO2 22 20 - 31 mmol/L    Anion Gap 13 9 - 17 mmol/L    GFR Non-African American >60 >60 mL/min    GFR African American >60 >60 mL/min    GFR Comment          GFR Staging NOT REPORTED    Troponin   Result Value Ref Range    Troponin T <0.03 <0.03 ng/mL    Troponin Cr baseline . Formal troponin normal    3 min smoking cessation provided     I spoke with cardiology who states they will see patient in morning. [facility] accepted the patient. PROCEDURES:  None    CONSULTS:  IP CONSULT TO HOSPITALIST  IP CONSULT TO CARDIOLOGY  IP CONSULT TO CARDIOLOGY    CRITICAL CARE:  None    FINAL IMPRESSION      1. Chest pain, unspecified type    2. Nondependent cocaine abuse    3. HOCM (hypertrophic obstructive cardiomyopathy) ([address]    4. Smoker            DISPOSITION / PLAN     DISPOSITION Admitted    PATIENT REFERRED TO:  No follow-up provider specified.     DISCHARGE MEDICATIONS:  New Prescriptions    No medications on file       Akua Valentino MD  Emergency Medicine Resident    (Please note that portions of this note were completed with a voice recognition program.  Efforts were made to edit the dictations but occasionally words are mis-transcribed.)        Akua Valentino MD  Resident  11/21/17 5074

## 2017-11-21 NOTE — CONSULTS
Attestation signed by      Attending Physician Statement:    I have discussed the care of  Rusty Smith , including pertinent history and exam findings, with the Cardiology fellow/resident. I have seen and examined the patient and the key elements of all parts of the encounter have been performed by me. I agree with the assessment, plan and orders as documented by the fellow/resident, after I modified exam findings and plan of treatments, and the final version is my approved version of the assessment. Additional Comments:   Known HOCM, with some chest pains, suggestive of LVOT obstruction- likely brought on by dehydration and polysubstance abuse  - IV hydration  - restart verapamil  - one more troponin  - plan for d/c today from cardiac    Thank you for allowing me to participate in the care of this patient, please do not hesitate to call if you have any questions. Dottie Cooks, [id number] [facility]  [facility] [employer]  [phone]     [facility]    Consult               Today's Date: 11/21/2017  Patient Name: Rusty Smith  Date of admission: 11/21/2017  4:03 AM  Patient's age: 28 y. o., 1985  Admission Dx: Chest pain [R07.9]    Reason for Consult:  Cardiac evaluation    Requesting Physician: Suzi Bailey DO    CHIEF COMPLAINT:  Chest Pain    History Obtained From:  patient, electronic medical record    HISTORY OF PRESENT ILLNESS:      The patient is a 28 y.o. male who is admitted to the hospital for chest pain. This is a known patient to our service. He has underlying HOCM and has a long history of cocaine use. After using cocaine he developed chest pain which brought him into the ER. He has not been compliant with his medications including CCB at home. Continues to use cocaine 2 times a week. Does not have chest pain every time he uses. Currently chest pain has significantly improved, dyspnea is also improving.     Past Medical History:   has a past medical history of DI (acute kidney injury) ([facility] [address] Asthma; Cardiomyopathy ([facility] [address] Cocaine abuse; Foreign body in ear; Heart attack; Hypertension; Hypokalemia; and Substance abuse. Past Surgical History:   has no past surgical history on file. Home Medications:    Prior to Admission medications    Medication Sig Start Date End Date Taking? Authorizing Provider   predniSONE (DELTASONE) 50 MG tablet Take 1 tablet by mouth daily 8/4/17   Kimberly Bradford MD   albuterol sulfate HFA (PROVENTIL HFA) 108 (90 Base) MCG/ACT inhaler Inhale 1-2 puffs into the lungs every 4 hours as needed for Wheezing or Shortness of Breath Space out to every 6 hours as symptoms improve.  8/4/17   Kimberly Bradford MD   aspirin 81 MG chewable tablet Take 1 tablet by mouth daily 8/4/17   Kimberly Bradford MD   aspirin 81 MG chewable tablet Take 1 tablet by mouth daily 8/4/17   Kimberly Bradford MD   beclomethasone (QVAR) 40 MCG/ACT inhaler Inhale 2 puffs into the lungs 2 times daily 8/4/17   Kimberly Bradford MD   cetirizine (ZYRTEC ALLERGY) 10 MG tablet Take 1 tablet by mouth daily 8/4/17   Kimberly Bradford MD   gabapentin (NEURONTIN) 100 MG capsule Take 2 capsules by mouth nightly 8/4/17   Kimberly Bradford MD   verapamil (CALAN SR) 240 MG extended release tablet Take 1 tablet by mouth daily 8/4/17   Kimberly Bradford MD   azithromycin (ZITHROMAX) 250 MG tablet Take 1 tablet by mouth daily 5/22/17   Mayuriluli Luque DO   gabapentin (NEURONTIN) 100 MG capsule Take 200 mg by mouth nightly    Historical Provider, MD   verapamil (CALAN-SR) 240 MG CR tablet Take 1 tablet by mouth daily 7/1/16   Cristin Bowden DO   beclomethasone (QVAR) 40 MCG/ACT inhaler Inhale 2 puffs into the lungs 2 times daily 7/1/16   Cristin Bowden DO      Current Facility-Administered Medications: beclomethasone (QVAR) 40 MCG/ACT inhaler 2 puff, 2 puff, Inhalation, BID  cetirizine (ZYRTEC) tablet 10 mg, 10 mg, Oral, Daily  gabapentin (NEURONTIN) capsule 200 mg, 200 mg, Oral, Nightly  predniSONE mother and another family member; Heart Disease in an other family member; Hypertension in an other family member. No h/o sudden cardiac death. No for premature CAD    REVIEW OF SYSTEMS:    · Constitutional: there has been no unanticipated weight loss. There's been No change in energy level, No change in activity level. · Eyes: No visual changes or diplopia. No scleral icterus. · ENT: No Headaches  · Cardiovascular: Chest Pain  · Respiratory: No previous pulmonary problems, No cough  · Gastrointestinal: No abdominal pain. No change in bowel or bladder habits. · Genitourinary: No dysuria, trouble voiding, or hematuria. · Musculoskeletal:  No gait disturbance, No weakness or joint complaints. · Integumentary: No rash or pruritis. · Neurological: No headache, diplopia, change in muscle strength, numbness or tingling. No change in gait, balance, coordination, mood, affect, memory, mentation, behavior. · Psychiatric: No anxiety, or depression. · Endocrine: No temperature intolerance. No excessive thirst, fluid intake, or urination. No tremor. · Hematologic/Lymphatic: No abnormal bruising or bleeding, blood clots or swollen lymph nodes. · Allergic/Immunologic: No nasal congestion or hives. PHYSICAL EXAM:      /74   Pulse 82   Temp 98.1 °F (36.7 °C) (Oral)   Resp 20   Wt 222 lb (100.7 kg)   SpO2 97%   BMI 26.33 kg/m²    Constitutional and General Appearance: alert, cooperative, no distress and appears stated age  HEENT: PERRL, no cervical lymphadenopathy. No masses palpable. Normal oral mucosa  Respiratory:  · Normal excursion and expansion without use of accessory muscles  · Resp Auscultation: Good respiratory effort. No for increased work of breathing. On auscultation: clear to auscultation bilaterally  Cardiovascular:  · The apical impulse is not displaced  · Heart tones are crisp and normal. regular S1 and S2.  Systolic murmur appreciated at LT sternal border  · Jugular venous pulsation abuser      RECOMMENDATIONS:  1. Continue Verapamil. Please do not administer BB due to recent cocaine use. 2. Has DI and known HOCM, will initiate IV fluids while he is in the hospital  3. Patient will need extensive counselling for drug and tobacco abuse  4. Follow up with us in clinic    Will discuss with rounding attending Dr. Ramiro Arroyo for final recommendations.     Silvia Birch MD  Cardiology Fellow

## 2017-11-21 NOTE — ED PROVIDER NOTES
[facility]  Faculty Handoff       Handoff taken on the following patient    Pt Name: Angelica Santos  PCP:  No primary care provider on file. 2010 [facility]       Chief Complaint   Patient presents with    Chest Pain         CURRENT MEDICATIONS     Previous Medications  Previous Medications    ALBUTEROL SULFATE HFA (PROVENTIL HFA) 108 (90 BASE) MCG/ACT INHALER    Inhale 1-2 puffs into the lungs every 4 hours as needed for Wheezing or Shortness of Breath Space out to every 6 hours as symptoms improve.     ASPIRIN 81 MG CHEWABLE TABLET    Take 1 tablet by mouth daily    ASPIRIN 81 MG CHEWABLE TABLET    Take 1 tablet by mouth daily    AZITHROMYCIN (ZITHROMAX) 250 MG TABLET    Take 1 tablet by mouth daily    BECLOMETHASONE (QVAR) 40 MCG/ACT INHALER    Inhale 2 puffs into the lungs 2 times daily    BECLOMETHASONE (QVAR) 40 MCG/ACT INHALER    Inhale 2 puffs into the lungs 2 times daily    CETIRIZINE (ZYRTEC ALLERGY) 10 MG TABLET    Take 1 tablet by mouth daily    GABAPENTIN (NEURONTIN) 100 MG CAPSULE    Take 200 mg by mouth nightly    GABAPENTIN (NEURONTIN) 100 MG CAPSULE    Take 2 capsules by mouth nightly    PREDNISONE (DELTASONE) 50 MG TABLET    Take 1 tablet by mouth daily    VERAPAMIL (CALAN SR) 240 MG EXTENDED RELEASE TABLET    Take 1 tablet by mouth daily    VERAPAMIL (CALAN-SR) 240 MG CR TABLET    Take 1 tablet by mouth daily       Encounter Medications  Orders Placed This Encounter   Medications    aspirin chewable tablet 324 mg    LORazepam (ATIVAN) injection 1 mg       ALLERGIES     is allergic to epinephrine; niacin and related; and seasonal.      RECENT VITALS:   Temp: 98.1 °F (36.7 °C),  Pulse: 82, Resp: 20, BP: 111/74      LABS:  Labs Reviewed   CBC WITH AUTO DIFFERENTIAL - Abnormal; Notable for the following:        Result Value    Hematocrit 50.5 (*)     All other components within normal limits   BASIC METABOLIC PANEL - Abnormal; Notable for the

## 2017-11-21 NOTE — H&P
Faith Ja[address] 19    HISTORY AND PHYSICAL EXAMINATION            Date:   11/21/2017  Patient name:  Darryn Wiseman  Date of admission:  11/21/2017  4:03 AM  MRN:   [id number]  Account:  [de-identified]  YOB: 1985  PCP:    No primary care provider on file. Room:   [address] [place] [address]  Code Status:    Full Code    Chief Complaint:     Chief Complaint   Patient presents with    Chest Pain       History Obtained From:     patient, electronic medical record    History of Present Illness: The patient is a 28 y.o. male who presents with:   Chest Pain, he is admitted to the hospital for further evaluation and treatment     Patient was admitted thru ER with:  \"Jaswant Astudillo is a 28 y.o. male who presents with acute   Left sided chest pain that has been on going since his cocaine abuse last used three hours pta. no radiation. It is sharp and last trasnitently and not aggravated or relieved by anything including medications, position or breathing. Nothing been given. Patient does not have adequate pain control.  Noncompliant with verapamil \"     The patient was admitted through the emergency room with chest pressure   He acknowledges that he had used cocaine  The pain is in the left chest   It does not radiate   He has felt somewhat short of breath   Initial evaluation revealed an elevated troponin   He does have HOCM -  Follows with [facility]   The patient reports that he is being evaluated for possible ICD   Reportedly he has been noncompliant to his Cardizem    Past Medical History:     Past Medical History:   Diagnosis Date    DI (acute kidney injury) ([facility] [address] 9/21/2015    Asthma     Cardiomyopathy ([facility] [address]     Cocaine abuse     Foreign body in ear 6/27/2014    Percocet pill    Heart attack 2007    has had 2 heart attacks    Hypertension     Hypokalemia 6/27/2014    Substance abuse     Cocaine        Past Surgical History:     History reviewed. No pertinent surgical history. He denies prior surgeries    Medications Prior to Admission:     Prior to Admission medications    Medication Sig Start Date End Date Taking? Authorizing Provider   predniSONE (DELTASONE) 50 MG tablet Take 1 tablet by mouth daily 8/4/17   Lazaro Marshall MD   albuterol sulfate HFA (PROVENTIL HFA) 108 (90 Base) MCG/ACT inhaler Inhale 1-2 puffs into the lungs every 4 hours as needed for Wheezing or Shortness of Breath Space out to every 6 hours as symptoms improve. 8/4/17   Lazaro Marshall MD   aspirin 81 MG chewable tablet Take 1 tablet by mouth daily 8/4/17   Lazaro Marshall MD   aspirin 81 MG chewable tablet Take 1 tablet by mouth daily 8/4/17   Lazaro Marshall MD   beclomethasone (QVAR) 40 MCG/ACT inhaler Inhale 2 puffs into the lungs 2 times daily 8/4/17   Lazaro Marshall MD   cetirizine (ZYRTEC ALLERGY) 10 MG tablet Take 1 tablet by mouth daily 8/4/17   Lazaro Marshall MD   gabapentin (NEURONTIN) 100 MG capsule Take 2 capsules by mouth nightly 8/4/17   Lazaro Marshall MD   verapamil (CALAN SR) 240 MG extended release tablet Take 1 tablet by mouth daily 8/4/17   Lazaro Marshall MD   azithromycin (ZITHROMAX) 250 MG tablet Take 1 tablet by mouth daily 5/22/17   [facility] RobertDO perry   gabapentin (NEURONTIN) 100 MG capsule Take 200 mg by mouth nightly    Historical Provider, MD   verapamil (CALAN-SR) 240 MG CR tablet Take 1 tablet by mouth daily 7/1/16   Montserrat Huizar DO   beclomethasone (QVAR) 40 MCG/ACT inhaler Inhale 2 puffs into the lungs 2 times daily 7/1/16   Montserrat Huizar DO        Allergies:     Epinephrine; Niacin and related; and Seasonal    Social History:     Tobacco:    reports that he has been smoking Cigarettes. He has a 12.00 pack-year smoking history. He does not have any smokeless tobacco history on file. Alcohol:      reports that he drinks alcohol. Drug Use:  reports that he uses drugs, including Marijuana and Cocaine.     Family History:     Family History   Problem Relation Age of Onset    Diabetes Other     Hypertension Other     Heart Disease Other     Diabetes Mother        Review of Systems:     Positive and Negative as described in HPI. Review of Systems   Constitutional: Negative for chills, diaphoresis, fever and malaise/fatigue. Eyes: Negative for blurred vision and double vision. Respiratory: Negative for cough, sputum production, shortness of breath and wheezing ( his asthma has been stable). Cardiovascular: Positive for chest pain ( nonexertional). Negative for palpitations and leg swelling. Gastrointestinal: Negative for blood in stool, melena, nausea and vomiting. Genitourinary: Negative for flank pain and hematuria. Musculoskeletal: Negative for joint pain and myalgias. Neurological: Negative for dizziness, focal weakness, seizures and weakness. Physical Exam:   BP (!) 145/73   Pulse 79   Temp 98.1 °F (36.7 °C) (Oral)   Resp 16   Wt 222 lb (100.7 kg)   SpO2 95%   BMI 26.33 kg/m²   Temp (24hrs), Av.1 °F (36.7 °C), Min:98.1 °F (36.7 °C), Max:98.2 °F (36.8 °C)    No results for input(s): POCGLU in the last 72 hours. No intake or output data in the 24 hours ending 17 4887    Physical Exam   Constitutional: He is oriented to person, place, and time. No distress. HENT:   Head: Normocephalic and atraumatic. Eyes: Conjunctivae are normal. No scleral icterus. Neck: Neck supple. Cardiovascular: Normal rate and regular rhythm. Pulmonary/Chest: Effort normal and breath sounds normal. No respiratory distress. He has no wheezes. Abdominal: Soft. Bowel sounds are normal. He exhibits no distension. There is no tenderness. Musculoskeletal: He exhibits no edema or tenderness. Neurological: He is alert and oriented to person, place, and time. Skin: Skin is warm and dry. He is not diaphoretic.    Psychiatric:   Affect flat       Structural Examination:   No scoliosis  No kyphosis  No paraspinal reactivity  ROM is intact Investigations:      Laboratory Testing:  Recent Results (from the past 24 hour(s))   EKG 12 Lead    Collection Time: 11/21/17  4:09 AM   Result Value Ref Range    Ventricular Rate 85 BPM    Atrial Rate 85 BPM    P-R Interval 146 ms    QRS Duration 100 ms    Q-T Interval 424 ms    QTc Calculation (Bazett) 504 ms    P Axis 75 degrees    R Axis 61 degrees    T Axis 85 degrees   POCT troponin    Collection Time: 11/21/17  4:28 AM   Result Value Ref Range    POC Troponin I 0.13 (HH) 0.00 - 0.10 ng/mL    POC Troponin Interp       The Troponin-I (POC) results cannot be compared to the Troponin-T results.    CBC Auto Differential    Collection Time: 11/21/17  4:32 AM   Result Value Ref Range    WBC 5.9 3.5 - 11.3 k/uL    RBC 5.52 4.21 - 5.77 m/uL    Hemoglobin 16.6 13.0 - 17.0 g/dL    Hematocrit 50.5 (H) 40.7 - 50.3 %    MCV 91.5 82.6 - 102.9 fL    MCH 30.1 25.2 - 33.5 pg    MCHC 32.9 28.4 - 34.8 g/dL    RDW 12.5 11.8 - 14.4 %    Platelets See Reflexed IPF Result 138 - 453 k/uL    MPV NOT REPORTED 8.1 - 13.5 fL    Differential Type NOT REPORTED     WBC Morphology NOT REPORTED     RBC Morphology NOT REPORTED     Platelet Estimate NOT REPORTED     Immature Granulocytes 0 0 %    Seg Neutrophils 34 %    Lymphocytes 54 %    Monocytes 8 %    Eosinophils % 4 %    Basophils 0 %    Absolute Immature Granulocyte 0.00 0.00 - 0.30 k/uL    Segs Absolute 2.01 1.8 - 7.7 k/uL    Absolute Lymph # 3.18 1.0 - 4.8 k/uL    Absolute Mono # 0.47 0.1 - 0.8 k/uL    Absolute Eos # 0.24 0.0 - 0.4 k/uL    Basophils # 0.00 0.0 - 0.2 k/uL    Morphology Normal    Basic Metabolic Panel    Collection Time: 11/21/17  4:32 AM   Result Value Ref Range    Glucose 114 (H) 70 - 99 mg/dL    BUN 9 6 - 20 mg/dL    CREATININE 1.29 (H) 0.70 - 1.20 mg/dL    Bun/Cre Ratio NOT REPORTED 9 - 20    Calcium 9.1 8.6 - 10.4 mg/dL    Sodium 138 135 - 144 mmol/L    Potassium 3.9 3.7 - 5.3 mmol/L    Chloride 103 98 - 107 mmol/L    CO2 22 20 - 31 mmol/L    Anion Gap 13 9 - 17 mmol/L    GFR Non-African American >60 >60 mL/min    GFR African American >60 >60 mL/min    GFR Comment          GFR Staging NOT REPORTED    Immature Platelet Fraction    Collection Time: 11/21/17  4:32 AM   Result Value Ref Range    Platelet, Immature Fraction NOT REPORTED 1.1 - 10.3 %    Platelet, Fluorescence Platelet clumps present, count appears decreased. 138 - 453 k/uL   Troponin    Collection Time: 11/21/17  4:32 AM   Result Value Ref Range    Troponin T <0.03 <0.03 ng/mL    Troponin Interp         Magnesium    Collection Time: 11/21/17  4:32 AM   Result Value Ref Range    Magnesium 2.0 1.6 - 2.6 mg/dL   POCT troponin    Collection Time: 11/21/17  6:19 AM   Result Value Ref Range    POC Troponin I 0.08 0.00 - 0.10 ng/mL    POC Troponin Interp       The Troponin-I (POC) results cannot be compared to the Troponin-T results. Troponin    Collection Time: 11/21/17  9:06 AM   Result Value Ref Range    Troponin T <0.03 <0.03 ng/mL    Troponin Interp         Troponin    Collection Time: 11/21/17 11:55 AM   Result Value Ref Range    Troponin T <0.03 <0.03 ng/mL    Troponin Interp             Imaging/Diagnostics:  CXR  Impression:        1. Borderline cardiomegaly, stable.          Assessment :      Principal Problem:    Chest pain  Active Problems:    Hypertrophic obstructive cardiomyopathy (HOCM) (HCC)    Tobacco abuse    Cocaine abuse    Atypical chest pain    DI (acute kidney injury) ([facility] [address]    Mild intermittent asthma without complication    Substance abuse    Noncompliance        Plan:     Admit  R/O ischemia  CV evaluation   Smoking cessation / education   Respiratory Therapy and Bronchodilators prn   Check bun and creatinine     Consultations:   IP CONSULT TO HOSPITALIST  IP CONSULT TO CARDIOLOGY  IP CONSULT TO CARDIOLOGY     Patient is admitted as inpatient status because of co-morbidities listed above, severity of signs and symptoms as outlined, requirement for current medical therapies and most importantly

## 2017-11-21 NOTE — CARE COORDINATION
Case Management Initial Discharge Plan  Ya Nidia,         Readmission Risk              Readmission Risk:        17.75       Age 72 or Greater:  0    Admitted from SNF or Requires Paid or Family Care:  0    Currently has CHF,COPD,ARF,CRI,or is on dialysis:  0    Takes more than 5 Prescription Medications:  4    Takes Digoxin,Insulin,Anticoagulants,Narcotics or ASA/Plavix:  [address] in Past 12 Months:  10    On Disability:  0    Patient Considers own Health:  3.75            Met with:patient to discuss discharge plans. Information verified: address, contacts, phone number, , insurance Yes  PCP: No primary care provider on file. Date of last visit: pt states he has not had a PCP for years    Insurance Provider: [employer]    Discharge Planning  Current Residence:  Homeless  Living Arrangements:  Friends   Home has na stories/na stairs to climb  Support Systems:  Friends/Neighbors  Current Services PTA:  None Supplier: na  Patient able to perform ADL's:Independent  DME used to aid ambulation prior to admission: none/during admission none    Potential Assistance Needed:  N/A    Pharmacy: [facility] and [facility] Medications:  No  Does patient want to participate in local refill/ meds to beds program?  No    Patient agreeable to home care: No  [place] of choice provided:  n/a      Type of Home Care Services:  None  Patient expects to be discharged to:  Friend's house    Prior SNF/Rehab Placement and Facility: no  Agreeable to SNF/Rehab: No  [place] of choice provided: n/a   Evaluation: n/a    Expected Discharge date: Follow Up Appointment: Best Day/ Time:      Transportation provider: friend  Transportation arrangements needed for discharge: No    Discharge Plan: Pt does not have a permanent address. Pt states he stays with different friends off and on. Pt states he was independent prior to discharge. Plan is home with friends.

## 2017-11-21 NOTE — ED NOTES
Pt ambulated to room 23. Steady and even gait. Pt c/o midsternal/left sided chest pain. Pt states this has been worsening throughout the day. Pt states he has a cardiac hx and is supposed to have an AICD placed soon. Pt reports using cocaine several hours ago and states the chest pain got worse afterwards. Pt states he became diaphoretic at the time but is not diaphoretic upon arrival.  Pt denies any n/v/d. Pt reports slight SOB. States he has asthma and usually has some SOB when it is cold outside. RR and unlabored. Pt placed on full cardiac monitor. Will continue to monitor.      Alan Vázquez RN  11/21/17 4503

## 2017-11-22 NOTE — PLAN OF CARE
[address] [place] [address]       Second Visit Note  For more detailed information please refer to the progress note of the day      11/21/2017    9:05 PM    Name:   Rusty Smith  MRN:     [id number]     Sally:      [de-identified]   Room:   [address] [place] [address] Day:  0  Admit Date:  11/21/2017  4:03 AM    PCP:   No primary care provider on file.   Code Status:  Full Code    Patient was seen  Feeling much better  Chest pain resolve  Hopes to go home    CURRENT MEDS:     beclomethasone (QVAR) 40 MCG/ACT inhaler 2 puff BID   cetirizine (ZYRTEC) tablet 10 mg Daily   gabapentin (NEURONTIN) capsule 200 mg Nightly   predniSONE (DELTASONE) tablet 50 mg Daily   verapamil (CALAN SR) extended release tablet 240 mg Daily   sodium chloride flush 0.9 % injection 10 mL 2 times per day   sodium chloride flush 0.9 % injection 10 mL PRN   potassium chloride (KLOR-CON M) extended release tablet 40 mEq PRN   Or    potassium chloride 20 MEQ/15ML (10%) oral solution 40 mEq PRN   Or    potassium chloride 10 mEq/100 mL IVPB (Peripheral Line) PRN   potassium chloride 10 mEq/100 mL IVPB (Peripheral Line) PRN   magnesium sulfate 1 g in dextrose 5% 100 mL IVPB PRN   acetaminophen (TYLENOL) tablet 650 mg Q4H PRN   morphine injection 2 mg Q2H PRN   Or    morphine (PF) injection 4 mg Q2H PRN   magnesium hydroxide (MILK OF MAGNESIA) 400 MG/5ML suspension 30 mL Daily PRN   docusate sodium (COLACE) capsule 100 mg BID   bisacodyl (DULCOLAX) suppository 10 mg Daily PRN   ondansetron (ZOFRAN) injection 4 mg Q6H PRN   enoxaparin (LOVENOX) injection 40 mg Daily   nitroGLYCERIN (NITROSTAT) SL tablet 0.4 mg Q5 Min PRN   lidocaine 1 % injection 5 mL Once   sodium chloride flush 0.9 % injection 10 mL 2 times per day   sodium chloride flush 0.9 % injection 10 mL PRN   [START ON 11/22/2017] aspirin EC tablet 81 mg Daily   0.9 % sodium chloride infusion Continuous       VITALS:  BP (!) 145/73   Pulse 79   Temp 98.1 °F (36.7 °C) (Oral)   Resp 16   Wt 222 lb (100.7 kg)   SpO2 95%   BMI 26.33 kg/m²     LABS:   Hematology:  Recent Labs      11/21/17   0432   WBC  5.9   HGB  16.6   HCT  50.5*   PLT  See Reflexed IPF Result     Chemistry:  Recent Labs      11/21/17 0432   NA  138   K  3.9   CL  103   CO2  22   GLUCOSE  114*   BUN  9   CREATININE  1.29*   MG  2.0   CALCIUM  9.1     Recent Labs      11/21/17 0428 11/21/17   0619   TROPONINI  0.13*  0.08       PROBLEMS:  Principal Problem:    Chest pain  Active Problems:    HOCM (hypertrophic obstructive cardiomyopathy) ([facility])    Smoker    Cocaine abuse    Atypical chest pain    DI (acute kidney injury) ([facility] [address]    Mild intermittent asthma without complication    Substance abuse    Noncompliance      UPDATED PLAN:  See current orders  CV evaluation  Noted  Smoking cessation / education   Respiratory Therapy and Bronchodilators prn   Check bun and creatinine   DC planning  Will discharge when arrangements complete and ok with other services.   Follow-up with PCP in one week, He will establish a new PCP    IP CONSULT TO HOSPITALIST  IP CONSULT TO CARDIOLOGY  IP CONSULT TO DO Merry  11/21/2017  9:05 PM

## 2017-11-22 NOTE — DISCHARGE SUMMARY
reports that he is being evaluated for possible ICD   Reportedly he has been noncompliant to his Cardizem      Initial assessment and plan:  Principal Problem:    Chest pain  Active Problems:    Hypertrophic obstructive cardiomyopathy (HOCM) (HCC)    Tobacco abuse    Cocaine abuse    Atypical chest pain    DI (acute kidney injury) ([facility] [address]    Mild intermittent asthma without complication    Substance abuse    Noncompliance           Plan:      Admit  R/O ischemia  CV evaluation   Smoking cessation / education   Respiratory Therapy and Bronchodilators prn   Check bun and creatinine       Significant Diagnostic Studies:   Labs / Micro:   Hematology:  Recent Labs      11/21/17   0432   WBC  5.9   HGB  16.6   HCT  50.5*   PLT  See Reflexed IPF Result     Chemistry:  Recent Labs      11/21/17 0432   NA  138   K  3.9   CL  103   CO2  22   GLUCOSE  114*   BUN  9   CREATININE  1.29*   MG  2.0   CALCIUM  9.1     Recent Labs      11/21/17 0428 11/21/17 0619   TROPONINI  0.13*  0.08         Radiology:    Xr Chest Portable    Result Date: 11/21/2017  EXAMINATION: SINGLE VIEW OF THE CHEST 11/21/2017 4:34 am COMPARISON: 08/30/2017. HISTORY: ORDERING SYSTEM PROVIDED HISTORY: chest pain TECHNOLOGIST PROVIDED HISTORY: Reason for exam:->chest pain Acute. Left-sided chest pain. Initial evaluation. FINDINGS: The cardiac silhouette is at the upper limits of normal in size given portable technique. Mediastinal contours are normal.  The lungs are clear. No focal lung infiltrate. No pleural effusion or pneumothorax. Visualized osseous structures are unremarkable. 1. Borderline cardiomegaly, stable. Consultations:    Consults:     Final Specialist Recommendations/Findings:   IP CONSULT TO HOSPITALIST  IP CONSULT TO CARDIOLOGY  IP CONSULT TO CARDIOLOGY      The patient was seen and examined on day of discharge and this discharge summary is in conjunction with any daily progress note from day of discharge.     Discharge plan:   Discharge planning:  The patient responded to medical treatment  They were discharged in improved condition  He will establish a new PCP  He has been strongly encouraged to avoid further substance-abuse    Discharged Condition:  Stable \    Disposition: Home    Physician Follow Up:   PCP of his choice in one week  Follow-up with cardiology as scheduled     Diet:   Cardiac    Activity:   As tolerated    Discharge Medications:      Medication List      CHANGE how you take these medications    aspirin 81 MG chewable tablet  Take 1 tablet by mouth daily  What changed:  Another medication with the same name was removed. Continue taking this medication, and follow the directions you see here. verapamil 240 MG extended release tablet  Commonly known as:  CALAN SR  Take 1 tablet by mouth daily  What changed:  Another medication with the same name was removed. Continue taking this medication, and follow the directions you see here. CONTINUE taking these medications    albuterol sulfate  (90 Base) MCG/ACT inhaler  Commonly known as:  PROVENTIL HFA  Inhale 1-2 puffs into the lungs every 4 hours as needed for Wheezing or Shortness of Breath Space out to every 6 hours as symptoms improve.      azithromycin 250 MG tablet  Commonly known as:  ZITHROMAX  Take 1 tablet by mouth daily     * beclomethasone 40 MCG/ACT inhaler  Commonly known as:  QVAR  Inhale 2 puffs into the lungs 2 times daily     * beclomethasone 40 MCG/ACT inhaler  Commonly known as:  QVAR  Inhale 2 puffs into the lungs 2 times daily     cetirizine 10 MG tablet  Commonly known as:  ZYRTEC ALLERGY  Take 1 tablet by mouth daily     * gabapentin 100 MG capsule  Commonly known as:  NEURONTIN     * gabapentin 100 MG capsule  Commonly known as:  NEURONTIN  Take 2 capsules by mouth nightly     predniSONE 50 MG tablet  Commonly known as:  DELTASONE  Take 1 tablet by mouth daily        * This list has 4 medication(s) that are the same as other medications prescribed for you. Read the directions carefully, and ask your doctor or other care provider to review them with you. Time Spent on discharge is  17 mins in patient examination, evaluation, counseling, medication reconciliation, discharge plan and follow up. Electronically signed by   Taylor Diop DO  11/21/2017  10:36 PM      Thank you Dr. Zita Guzman primary care provider on file. for the opportunity to be involved in this patient's care.

## 2018-08-21 ENCOUNTER — HOSPITAL ENCOUNTER (EMERGENCY)
Age: 33
Discharge: HOME | End: 2018-08-21
Attending: EMERGENCY MEDICINE
Payer: MEDICAID

## 2018-08-21 ENCOUNTER — APPOINTMENT (OUTPATIENT)
Dept: GENERAL RADIOLOGY | Age: 33
End: 2018-08-21
Payer: MEDICAID

## 2018-08-21 VITALS
RESPIRATION RATE: 18 BRPM | WEIGHT: 230 LBS | HEART RATE: 98 BPM | BODY MASS INDEX: 26.61 KG/M2 | TEMPERATURE: 100.7 F | HEIGHT: 78 IN | SYSTOLIC BLOOD PRESSURE: 123 MMHG | OXYGEN SATURATION: 97 % | DIASTOLIC BLOOD PRESSURE: 70 MMHG

## 2018-08-21 DIAGNOSIS — J21.9 ACUTE BRONCHIOLITIS DUE TO UNSPECIFIED ORGANISM: Primary | ICD-10-CM

## 2018-08-21 LAB
DIRECT EXAM: NORMAL
Lab: NORMAL
SPECIMEN DESCRIPTION: NORMAL
STATUS: NORMAL

## 2018-08-21 PROCEDURE — 71046 X-RAY EXAM CHEST 2 VIEWS: CPT

## 2018-08-21 PROCEDURE — 87804 INFLUENZA ASSAY W/OPTIC: CPT

## 2018-08-21 PROCEDURE — 94640 AIRWAY INHALATION TREATMENT: CPT

## 2018-08-21 PROCEDURE — 6360000002 HC RX W HCPCS: Performed by: STUDENT IN AN ORGANIZED HEALTH CARE EDUCATION/TRAINING PROGRAM

## 2018-08-21 PROCEDURE — 6370000000 HC RX 637 (ALT 250 FOR IP): Performed by: STUDENT IN AN ORGANIZED HEALTH CARE EDUCATION/TRAINING PROGRAM

## 2018-08-21 PROCEDURE — 99284 EMERGENCY DEPT VISIT MOD MDM: CPT

## 2018-08-21 RX ORDER — ACETAMINOPHEN 325 MG/1
650 TABLET ORAL EVERY 6 HOURS PRN
Status: DISCONTINUED | OUTPATIENT
Start: 2018-08-21 | End: 2018-08-21 | Stop reason: HOSPADM

## 2018-08-21 RX ORDER — ALBUTEROL SULFATE 2.5 MG/3ML
5 SOLUTION RESPIRATORY (INHALATION)
Status: DISCONTINUED | OUTPATIENT
Start: 2018-08-21 | End: 2018-08-21

## 2018-08-21 RX ORDER — ALBUTEROL SULFATE 90 UG/1
2 AEROSOL, METERED RESPIRATORY (INHALATION)
Status: DISCONTINUED | OUTPATIENT
Start: 2018-08-21 | End: 2018-08-21

## 2018-08-21 RX ORDER — ACETAMINOPHEN 500 MG
1000 TABLET ORAL ONCE
Status: COMPLETED | OUTPATIENT
Start: 2018-08-21 | End: 2018-08-21

## 2018-08-21 RX ORDER — PREDNISONE 10 MG/1
TABLET ORAL
Qty: 20 TABLET | Refills: 0 | Status: SHIPPED | OUTPATIENT
Start: 2018-08-21 | End: 2018-08-31

## 2018-08-21 RX ORDER — AZITHROMYCIN 250 MG/1
TABLET, FILM COATED ORAL
Qty: 1 PACKET | Refills: 0 | Status: SHIPPED | OUTPATIENT
Start: 2018-08-21 | End: 2018-08-31

## 2018-08-21 RX ORDER — IBUPROFEN 400 MG/1
400 TABLET ORAL EVERY 6 HOURS PRN
Status: DISCONTINUED | OUTPATIENT
Start: 2018-08-21 | End: 2018-08-21 | Stop reason: HOSPADM

## 2018-08-21 RX ORDER — IPRATROPIUM BROMIDE AND ALBUTEROL SULFATE 2.5; .5 MG/3ML; MG/3ML
1 SOLUTION RESPIRATORY (INHALATION)
Status: DISCONTINUED | OUTPATIENT
Start: 2018-08-21 | End: 2018-08-21

## 2018-08-21 RX ADMIN — ALBUTEROL SULFATE 10 MG: 5 SOLUTION RESPIRATORY (INHALATION) at 01:32

## 2018-08-21 RX ADMIN — ACETAMINOPHEN 650 MG: 325 TABLET ORAL at 04:12

## 2018-08-21 RX ADMIN — IBUPROFEN 400 MG: 400 TABLET ORAL at 04:12

## 2018-08-21 RX ADMIN — ACETAMINOPHEN 1000 MG: 500 TABLET ORAL at 02:17

## 2018-08-21 ASSESSMENT — PAIN SCALES - GENERAL: PAINLEVEL_OUTOF10: 0

## 2018-08-21 NOTE — ED PROVIDER NOTES
me      CRITICAL CARE: There was a high probability of clinically significant/life threatening deterioration in this patient's condition which required my urgent intervention. Total critical care time was  0    minutes. This excludes any time for separately reportable procedures.        [address],   08/21/18 [address], DO  08/21/18 [id number]

## 2018-08-21 NOTE — PROGRESS NOTES
Emergency Department Bronchodilator Assessment    Patient Assessment complete. No admission diagnoses are documented for this encounter. .   Vitals:    08/21/18 0108   BP: 123/70   Pulse: 98   Resp: 18   Temp: 100.7 °F (38.2 °C)   SpO2: 97%   . Patients home meds are   Prior to Admission medications    Medication Sig Start Date End Date Taking? Authorizing Provider   albuterol sulfate HFA (PROVENTIL HFA) 108 (90 Base) MCG/ACT inhaler Inhale 1-2 puffs into the lungs every 4 hours as needed for Wheezing or Shortness of Breath Space out to every 6 hours as symptoms improve. 8/4/17  Yes Ashia Nowak MD   beclomethasone (QVAR) 40 MCG/ACT inhaler Inhale 2 puffs into the lungs 2 times daily 8/4/17  Yes Ashia Nowak MD   verapamil (CALAN SR) 240 MG extended release tablet Take 1 tablet by mouth daily 8/4/17  Yes Ashia Nowak MD   gabapentin (NEURONTIN) 100 MG capsule Take 200 mg by mouth nightly   Yes Historical Provider, MD   beclomethasone (QVAR) 40 MCG/ACT inhaler Inhale 2 puffs into the lungs 2 times daily 7/1/16  Yes Wendy Martins DO   predniSONE (DELTASONE) 50 MG tablet Take 1 tablet by mouth daily 8/4/17   Ashia Nowak MD   aspirin 81 MG chewable tablet Take 1 tablet by mouth daily 8/4/17   Ashia Nowak MD   cetirizine (ZYRTEC ALLERGY) 10 MG tablet Take 1 tablet by mouth daily 8/4/17   Ashia Nowak MD   gabapentin (NEURONTIN) 100 MG capsule Take 2 capsules by mouth nightly 8/4/17   Ashia Nowak MD   azithromycin (ZITHROMAX) 250 MG tablet Take 1 tablet by mouth daily 5/22/17   Mayuri Luque DO   .         HHN treatment(s) given x 1.       Breath Sounds:   LUNG FIELD Pre-Treatment Post-Treatment   RUL Exp wheezes  clear/scattered wheezes   RML  exp wheezes  clear/scattered wheezes   RLL  exp wheezes/diminished   diminished   LEONIDAS  exp wheezes   clear/scattered wheezes   LLL  exp wheezes/diminished   diminished     COMMENTS:  Pt has a history of asthma and takes an albuterol MDI at home and Dulera 200 BID  Pt did not get any relief from inhaler, states its been going on for about a week  · Bronchodilator assessment at level  2  [x]    Bronchodilator Assessment    BRONCHODILATOR ASSESSMENT SCORE  Score 1 2 3 4   Breath Sounds   []  Clear []  Mild Wheezing with good aeration [x]  Moderate I/E wheezing with adequate aeration []  Poor Aeration or diffuse wheezing   Respiratory Rate []  Less than 20 [x]  20-25 []  Greater than 25  []  Greater than 35    Dyspnea []  No SOB  [x]  SOB with minimal activity []  Speaking in partial sentences []  Acute/ At rest   Peakflow (asthma) []  80 % or greater predicted/PB  []  Unable []  70% or greater predicted/PB  []  Unable []  51%-70% predicted/PB  []  Unable []  Less than 50% predicted/PB  []  Unable due to distress   FEV1 % Predicted []  Greater than 69%  []  Unable  []  Less than 50%-69%  []  Unable  []  Less than 35%-49%  []  Unable  []  Less than 35%  []  Unable due to distress     Predicted Peak Expiratory Flow Rate                                       Height (in)  Female       Height (in) Male           Age 64 59 61 64 59 78 79 71 Age            24 061 602 217 602 674 750 278 931  62 44 47 25 90 66 22 85 43   72 252 465 296 476 821 398 336 807 90 872 067 175 290 957 785 728 518 233   80 836 598 283 111 406 115 940 847 17 833 103 854 348 478 455 563 560 922   23 133 447 321 998 869 504 339 070 48 109 915 715 527 710 734 033 382 110   95 824 186 723 373 691 384 573 898 99 882 942 573 234 851 446 998 963 562   44 545 160 671 435 565 076 601 500 89 918 581 474 773 014 585 421 959 869   93 337 098 529 787 583 830 447 144 72 931 735 223 864 130 238 910 453 251   49 882 239 750 347 308 697 150 102 58 781 275 379 184 479 890 182 167 268   91 508 977 893 981 894 158 507 412 54 015 729 572 863 960 454 248 801 684   74 669 873 611 322 530 996 606 760 52 486 474 578 983 255 425 358 401 339   19 648 544 466 692 257 685 406 729 37 817 589 731 805 351 252 638 463 436   08 568 175 784 947 835 259 317 165

## 2018-08-21 NOTE — ED PROVIDER NOTES
[facility] ED  Emergency Department Encounter  Emergency Medicine Resident     Pt Name: Jeraline Boast  MRN: [id number]  [place] 1985  Date of evaluation: 8/21/18  PCP:  No primary care provider on file. CHIEF COMPLAINT       Chief Complaint   Patient presents with    Cough    Headache       HISTORY OF PRESENT ILLNESS  (Location/Symptom, Timing/Onset, Context/Setting, Quality, Duration, Modifying Factors, Severity.)      Jeraline Boast is a 28 y.o. male who presents with Productive cough, headache, and fever. Patient states he had these symptoms starting 5 days ago. He has not tried any medications at home to alleviate his symptoms. Patient denies headache and sudden in nature, any photophobia, neck pain, recent sick contacts. He has an inhaler in his pocket and says he's tried it for his diagnosed asthma but it has not helped him. Patient is laying comfortably in the room, however he is coughing with phlegm. Patient denies any other medical history or any other medical problems at this time. PAST MEDICAL / SURGICAL / SOCIAL / FAMILY HISTORY      has a past medical history of DI (acute kidney injury) ([facility] Asthma; Cardiomyopathy ([facility] Cocaine abuse; Foreign body in ear; Heart attack ([facility] Hypertension; Hypokalemia; and Substance abuse.     has no past surgical history on file.      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 [employer]     Social History Main Topics    Smoking status: Current Every Day Smoker     Packs/day: 1.00     Years: 12.00     Types: Cigarettes    Smokeless tobacco: Not on file    Alcohol use Yes      Comment: drinks socially    Drug use: Yes     Types: Marijuana, Cocaine      Comment: last used on 5/19    Sexual activity: Yes     Other Topics Concern    Not on file     Social History Narrative  No narrative on file       Family History   Problem Relation Age of Onset    Diabetes Other     Hypertension Other     Heart Disease Other     Diabetes Mother         Allergies:  Epinephrine; Niacin and related; and Seasonal    Home Medications:  Prior to Admission medications    Medication Sig Start Date End Date Taking? Authorizing Provider   azithromycin (ZITHROMAX) 250 MG tablet Take 2 tablets (500 mg) on Day 1, followed by 1 tablet (250 mg) once daily on Days 2 through 5. 8/21/18 8/31/18 Yes Shaista Lamas DO   predniSONE (DELTASONE) 10 MG tablet Take 4 tablets by mouth once daily for 5 days 8/21/18 8/31/18 Yes Shaista Lamas,    albuterol sulfate HFA (PROVENTIL HFA) 108 (90 Base) MCG/ACT inhaler Inhale 1-2 puffs into the lungs every 4 hours as needed for Wheezing or Shortness of Breath Space out to every 6 hours as symptoms improve.  8/4/17  Yes Lisset Robison MD   beclomethasone (QVAR) 40 MCG/ACT inhaler Inhale 2 puffs into the lungs 2 times daily 8/4/17  Yes Lisset Robison MD   verapamil (CALAN SR) 240 MG extended release tablet Take 1 tablet by mouth daily 8/4/17  Yes Lisset Robison MD   gabapentin (NEURONTIN) 100 MG capsule Take 200 mg by mouth nightly   Yes Historical Provider, MD   beclomethasone (QVAR) 40 MCG/ACT inhaler Inhale 2 puffs into the lungs 2 times daily 7/1/16  Yes Mercedes Robles DO   predniSONE (DELTASONE) 50 MG tablet Take 1 tablet by mouth daily 8/4/17   Lisset Robison MD   aspirin 81 MG chewable tablet Take 1 tablet by mouth daily 8/4/17   Lisset Robison MD   cetirizine (ZYRTEC ALLERGY) 10 MG tablet Take 1 tablet by mouth daily 8/4/17   Lisset Robison MD   gabapentin (NEURONTIN) 100 MG capsule Take 2 capsules by mouth nightly 8/4/17   Lisset Robison MD   azithromycin (ZITHROMAX) 250 MG tablet Take 1 tablet by mouth daily 5/22/17   Mayuri Luque, DO       REVIEW OF SYSTEMS    (2-9 systems for level 4, 10 or more for level 5)      Constitutional ROS - + recent fevers, + recent ORDERED:  Orders Placed This Encounter   Medications    DISCONTD: albuterol (PROVENTIL) nebulizer solution 5 mg    DISCONTD: ipratropium-albuterol (DUONEB) nebulizer solution 1 ampule    DISCONTD: albuterol (PROVENTIL) nebulizer solution 5 mg    DISCONTD: albuterol sulfate  (90 Base) MCG/ACT inhaler 2 puff    DISCONTD: albuterol sulfate  (90 Base) MCG/ACT inhaler 2 puff    DISCONTD: ipratropium (ATROVENT HFA) 17 MCG/ACT inhaler 2 puff    ipratropium (ATROVENT) 0.02 % nebulizer solution 0.5 mg    acetaminophen (TYLENOL) tablet 1,000 mg    albuterol (PROVENTIL) nebulizer solution 10 mg    azithromycin (ZITHROMAX) 250 MG tablet     Sig: Take 2 tablets (500 mg) on Day 1, followed by 1 tablet (250 mg) once daily on Days 2 through 5. Dispense:  1 packet     Refill:  0    predniSONE (DELTASONE) 10 MG tablet     Sig: Take 4 tablets by mouth once daily for 5 days     Dispense:  20 tablet     Refill:  0    acetaminophen (TYLENOL) tablet 650 mg    ibuprofen (ADVIL;MOTRIN) tablet 400 mg       DDX: Upper respiratory infection, viral respiratory infection, influenza, pneumonia, bronchitis, bronchiolitis, pneumonitis,    Initial MDM/Plan: 28 y.o. male who presents with 5 days of productive cough, headache, nasal congestion. Patient has a history of asthma and gastritis inhaler home with no improvement of symptoms. Here in the emergency department and a treatment was initiated by respiratory therapy. Chest x-ray showed interstitial infiltrates possible bronchiolitis or pneumonitis. Patient was discharged home with azithromycin antibiotics, and a steroid burst pack. Patient was also given information by  to assist in setting up a primary care follow-up appointment. Patient was instructed to use Tylenol and ibuprofen, his fevers. Patient was given adequate return precautions. Patient understands and is compliant.      DIAGNOSTIC RESULTS / EMERGENCY DEPARTMENT COURSE / MDM daily on Days 2 through 5.     PREDNISONE (DELTASONE) 10 MG TABLET    Take 4 tablets by mouth once daily for 5 days       Prabha Bradford DO  Emergency Medicine Resident    (Please note that portions of this note were completed with a voice recognition program.  Efforts were made to edit the dictations but occasionally words are mis-transcribed.)        Prabha Bradford DO  Resident  08/21/18 4349

## 2018-08-21 NOTE — ED NOTES
Pt sleeping on cot with NAD noted.  Pt states he feels a little better when woken up for medications      Ady Ponce RN  08/21/18 4780

## 2018-08-21 NOTE — ED NOTES
Pt to ED with c/o of cold symptoms x 3 days, including cough, nasal congestion. Pt has hx of asthma, is wheezing and has exp grunting. Pt appears mildly lethargic. Pt did use his rescue inhaler at home with little relief. Pt able to speak in complete sentences. Pt is A&O x4. NAD noted.       Waylon Bishop, JACQUELIN  08/21/18 7937

## 2018-10-28 ENCOUNTER — HOSPITAL ENCOUNTER (INPATIENT)
Age: 33
LOS: 1 days | Discharge: HOME | DRG: 194 | End: 2018-10-30
Attending: EMERGENCY MEDICINE | Admitting: INTERNAL MEDICINE
Payer: MEDICAID

## 2018-10-28 DIAGNOSIS — R06.00 DYSPNEA, UNSPECIFIED TYPE: ICD-10-CM

## 2018-10-28 DIAGNOSIS — I50.9 CONGESTIVE HEART FAILURE, UNSPECIFIED HF CHRONICITY, UNSPECIFIED HEART FAILURE TYPE (HCC): Primary | ICD-10-CM

## 2018-10-28 DIAGNOSIS — R60.0 LEG EDEMA: ICD-10-CM

## 2018-10-28 LAB
EKG ATRIAL RATE: 96 BPM
EKG P AXIS: 64 DEGREES
EKG P-R INTERVAL: 150 MS
EKG Q-T INTERVAL: 384 MS
EKG QRS DURATION: 98 MS
EKG QTC CALCULATION (BAZETT): 485 MS
EKG R AXIS: 45 DEGREES
EKG T AXIS: 108 DEGREES
EKG VENTRICULAR RATE: 96 BPM

## 2018-10-28 PROCEDURE — G0384 LEV 5 HOSP TYPE B ED VISIT: HCPCS

## 2018-10-28 PROCEDURE — 93005 ELECTROCARDIOGRAM TRACING: CPT

## 2018-10-28 ASSESSMENT — PAIN DESCRIPTION - LOCATION: LOCATION: CHEST

## 2018-10-28 ASSESSMENT — PAIN SCALES - GENERAL: PAINLEVEL_OUTOF10: 6

## 2018-10-29 ENCOUNTER — APPOINTMENT (OUTPATIENT)
Dept: GENERAL RADIOLOGY | Age: 33
DRG: 194 | End: 2018-10-29
Payer: MEDICAID

## 2018-10-29 PROBLEM — R60.0 LOWER EXTREMITY EDEMA: Status: ACTIVE | Noted: 2018-10-29

## 2018-10-29 LAB
ABSOLUTE EOS #: 0.46 K/UL (ref 0–0.44)
ABSOLUTE IMMATURE GRANULOCYTE: 0.03 K/UL (ref 0–0.3)
ABSOLUTE LYMPH #: 2.55 K/UL (ref 1.1–3.7)
ABSOLUTE MONO #: 0.7 K/UL (ref 0.1–1.2)
AMPHETAMINE SCREEN URINE: NEGATIVE
ANION GAP SERPL CALCULATED.3IONS-SCNC: 12 MMOL/L (ref 9–17)
BARBITURATE SCREEN URINE: NEGATIVE
BASOPHILS # BLD: 1 % (ref 0–2)
BASOPHILS ABSOLUTE: 0.05 K/UL (ref 0–0.2)
BENZODIAZEPINE SCREEN, URINE: NEGATIVE
BNP INTERPRETATION: ABNORMAL
BUN BLDV-MCNC: 8 MG/DL (ref 6–20)
BUN/CREAT BLD: ABNORMAL (ref 9–20)
BUPRENORPHINE URINE: ABNORMAL
CALCIUM IONIZED: 1.15 MMOL/L (ref 1.13–1.33)
CALCIUM SERPL-MCNC: 8.8 MG/DL (ref 8.6–10.4)
CANNABINOID SCREEN URINE: POSITIVE
CHLORIDE BLD-SCNC: 103 MMOL/L (ref 98–107)
CO2: 25 MMOL/L (ref 20–31)
COCAINE METABOLITE, URINE: POSITIVE
CREAT SERPL-MCNC: 1.17 MG/DL (ref 0.7–1.2)
DIFFERENTIAL TYPE: ABNORMAL
EOSINOPHILS RELATIVE PERCENT: 6 % (ref 1–4)
GFR AFRICAN AMERICAN: >60 ML/MIN
GFR NON-AFRICAN AMERICAN: >60 ML/MIN
GFR SERPL CREATININE-BSD FRML MDRD: ABNORMAL ML/MIN/{1.73_M2}
GFR SERPL CREATININE-BSD FRML MDRD: ABNORMAL ML/MIN/{1.73_M2}
GLUCOSE BLD-MCNC: 117 MG/DL (ref 70–99)
HCT VFR BLD CALC: 44.2 % (ref 40.7–50.3)
HEMOGLOBIN: 14.2 G/DL (ref 13–17)
IMMATURE GRANULOCYTES: 0 %
LV EF: 65 %
LVEF MODALITY: NORMAL
LYMPHOCYTES # BLD: 33 % (ref 24–43)
MCH RBC QN AUTO: 30 PG (ref 25.2–33.5)
MCHC RBC AUTO-ENTMCNC: 32.1 G/DL (ref 28.4–34.8)
MCV RBC AUTO: 93.2 FL (ref 82.6–102.9)
MDMA URINE: ABNORMAL
METHADONE SCREEN, URINE: NEGATIVE
METHAMPHETAMINE, URINE: ABNORMAL
MONOCYTES # BLD: 9 % (ref 3–12)
NRBC AUTOMATED: 0 PER 100 WBC
OPIATES, URINE: NEGATIVE
OXYCODONE SCREEN URINE: NEGATIVE
PDW BLD-RTO: 13.6 % (ref 11.8–14.4)
PHENCYCLIDINE, URINE: NEGATIVE
PLATELET # BLD: 198 K/UL (ref 138–453)
PLATELET ESTIMATE: ABNORMAL
PMV BLD AUTO: 11.9 FL (ref 8.1–13.5)
POC TROPONIN I: 0.13 NG/ML (ref 0–0.1)
POC TROPONIN INTERP: ABNORMAL
POTASSIUM SERPL-SCNC: 3.8 MMOL/L (ref 3.7–5.3)
PRO-BNP: 1361 PG/ML
PROPOXYPHENE, URINE: ABNORMAL
RBC # BLD: 4.74 M/UL (ref 4.21–5.77)
RBC # BLD: ABNORMAL 10*6/UL
SEG NEUTROPHILS: 51 % (ref 36–65)
SEGMENTED NEUTROPHILS ABSOLUTE COUNT: 3.96 K/UL (ref 1.5–8.1)
SODIUM BLD-SCNC: 140 MMOL/L (ref 135–144)
TEST INFORMATION: ABNORMAL
TRICYCLIC ANTIDEPRESSANTS, UR: ABNORMAL
TROPONIN INTERP: NORMAL
TROPONIN T: <0.03 NG/ML
WBC # BLD: 7.8 K/UL (ref 3.5–11.3)
WBC # BLD: ABNORMAL 10*3/UL

## 2018-10-29 PROCEDURE — G0378 HOSPITAL OBSERVATION PER HR: HCPCS

## 2018-10-29 PROCEDURE — 96374 THER/PROPH/DIAG INJ IV PUSH: CPT

## 2018-10-29 PROCEDURE — 94762 N-INVAS EAR/PLS OXIMTRY CONT: CPT

## 2018-10-29 PROCEDURE — 6360000002 HC RX W HCPCS: Performed by: INTERNAL MEDICINE

## 2018-10-29 PROCEDURE — 6370000000 HC RX 637 (ALT 250 FOR IP): Performed by: NURSE PRACTITIONER

## 2018-10-29 PROCEDURE — 80048 BASIC METABOLIC PNL TOTAL CA: CPT

## 2018-10-29 PROCEDURE — 94664 DEMO&/EVAL PT USE INHALER: CPT

## 2018-10-29 PROCEDURE — 82330 ASSAY OF CALCIUM: CPT

## 2018-10-29 PROCEDURE — C8929 TTE W OR WO FOL WCON,DOPPLER: HCPCS

## 2018-10-29 PROCEDURE — 71046 X-RAY EXAM CHEST 2 VIEWS: CPT

## 2018-10-29 PROCEDURE — 80307 DRUG TEST PRSMV CHEM ANLYZR: CPT

## 2018-10-29 PROCEDURE — 99222 1ST HOSP IP/OBS MODERATE 55: CPT | Performed by: FAMILY MEDICINE

## 2018-10-29 PROCEDURE — 2580000003 HC RX 258: Performed by: NURSE PRACTITIONER

## 2018-10-29 PROCEDURE — 84484 ASSAY OF TROPONIN QUANT: CPT

## 2018-10-29 PROCEDURE — 1200000000 HC SEMI PRIVATE

## 2018-10-29 PROCEDURE — 85025 COMPLETE CBC W/AUTO DIFF WBC: CPT

## 2018-10-29 PROCEDURE — 6370000000 HC RX 637 (ALT 250 FOR IP): Performed by: EMERGENCY MEDICINE

## 2018-10-29 PROCEDURE — 6360000002 HC RX W HCPCS: Performed by: NURSE PRACTITIONER

## 2018-10-29 PROCEDURE — 6360000002 HC RX W HCPCS: Performed by: EMERGENCY MEDICINE

## 2018-10-29 PROCEDURE — 83880 ASSAY OF NATRIURETIC PEPTIDE: CPT

## 2018-10-29 PROCEDURE — 96376 TX/PRO/DX INJ SAME DRUG ADON: CPT

## 2018-10-29 PROCEDURE — 94640 AIRWAY INHALATION TREATMENT: CPT

## 2018-10-29 RX ORDER — FLUTICASONE PROPIONATE 110 UG/1
1 AEROSOL, METERED RESPIRATORY (INHALATION) 2 TIMES DAILY
Status: DISCONTINUED | OUTPATIENT
Start: 2018-10-29 | End: 2018-10-29

## 2018-10-29 RX ORDER — GABAPENTIN 100 MG/1
200 CAPSULE ORAL NIGHTLY
Status: DISCONTINUED | OUTPATIENT
Start: 2018-10-29 | End: 2018-10-30 | Stop reason: HOSPADM

## 2018-10-29 RX ORDER — DOCUSATE SODIUM 100 MG/1
100 CAPSULE, LIQUID FILLED ORAL 2 TIMES DAILY
Status: DISCONTINUED | OUTPATIENT
Start: 2018-10-29 | End: 2018-10-30 | Stop reason: HOSPADM

## 2018-10-29 RX ORDER — PREDNISONE 50 MG/1
50 TABLET ORAL DAILY
Status: DISCONTINUED | OUTPATIENT
Start: 2018-10-29 | End: 2018-10-29

## 2018-10-29 RX ORDER — METOPROLOL TARTRATE 50 MG/1
25 TABLET, FILM COATED ORAL ONCE
Status: COMPLETED | OUTPATIENT
Start: 2018-10-29 | End: 2018-10-29

## 2018-10-29 RX ORDER — FUROSEMIDE 10 MG/ML
20 INJECTION INTRAMUSCULAR; INTRAVENOUS ONCE
Status: COMPLETED | OUTPATIENT
Start: 2018-10-29 | End: 2018-10-29

## 2018-10-29 RX ORDER — LISINOPRIL 5 MG/1
5 TABLET ORAL DAILY
Status: DISCONTINUED | OUTPATIENT
Start: 2018-10-30 | End: 2018-10-30 | Stop reason: HOSPADM

## 2018-10-29 RX ORDER — HEPARIN SODIUM 5000 [USP'U]/ML
5000 INJECTION, SOLUTION INTRAVENOUS; SUBCUTANEOUS EVERY 8 HOURS SCHEDULED
Status: DISCONTINUED | OUTPATIENT
Start: 2018-10-29 | End: 2018-10-30 | Stop reason: HOSPADM

## 2018-10-29 RX ORDER — ONDANSETRON 2 MG/ML
4 INJECTION INTRAMUSCULAR; INTRAVENOUS EVERY 6 HOURS PRN
Status: DISCONTINUED | OUTPATIENT
Start: 2018-10-29 | End: 2018-10-30 | Stop reason: HOSPADM

## 2018-10-29 RX ORDER — ACETAMINOPHEN 325 MG/1
650 TABLET ORAL EVERY 4 HOURS PRN
Status: DISCONTINUED | OUTPATIENT
Start: 2018-10-29 | End: 2018-10-30 | Stop reason: HOSPADM

## 2018-10-29 RX ORDER — ALBUTEROL SULFATE 2.5 MG/3ML
2.5 SOLUTION RESPIRATORY (INHALATION)
Status: DISCONTINUED | OUTPATIENT
Start: 2018-10-29 | End: 2018-10-29

## 2018-10-29 RX ORDER — ASPIRIN 81 MG/1
81 TABLET, CHEWABLE ORAL DAILY
Status: DISCONTINUED | OUTPATIENT
Start: 2018-10-29 | End: 2018-10-30 | Stop reason: HOSPADM

## 2018-10-29 RX ORDER — NICOTINE 21 MG/24HR
1 PATCH, TRANSDERMAL 24 HOURS TRANSDERMAL DAILY PRN
Status: DISCONTINUED | OUTPATIENT
Start: 2018-10-29 | End: 2018-10-30 | Stop reason: HOSPADM

## 2018-10-29 RX ORDER — SODIUM CHLORIDE 0.9 % (FLUSH) 0.9 %
10 SYRINGE (ML) INJECTION EVERY 12 HOURS SCHEDULED
Status: DISCONTINUED | OUTPATIENT
Start: 2018-10-29 | End: 2018-10-30 | Stop reason: HOSPADM

## 2018-10-29 RX ORDER — ALBUTEROL SULFATE 90 UG/1
2 AEROSOL, METERED RESPIRATORY (INHALATION) 3 TIMES DAILY
Status: DISCONTINUED | OUTPATIENT
Start: 2018-10-29 | End: 2018-10-30 | Stop reason: HOSPADM

## 2018-10-29 RX ORDER — BISACODYL 10 MG
10 SUPPOSITORY, RECTAL RECTAL DAILY PRN
Status: DISCONTINUED | OUTPATIENT
Start: 2018-10-29 | End: 2018-10-30 | Stop reason: HOSPADM

## 2018-10-29 RX ORDER — ALBUTEROL SULFATE 90 UG/1
2 AEROSOL, METERED RESPIRATORY (INHALATION) EVERY 6 HOURS PRN
Status: DISCONTINUED | OUTPATIENT
Start: 2018-10-29 | End: 2018-10-30 | Stop reason: HOSPADM

## 2018-10-29 RX ORDER — VERAPAMIL HYDROCHLORIDE 240 MG/1
240 TABLET, FILM COATED, EXTENDED RELEASE ORAL DAILY
Status: DISCONTINUED | OUTPATIENT
Start: 2018-10-29 | End: 2018-10-30 | Stop reason: HOSPADM

## 2018-10-29 RX ORDER — SODIUM CHLORIDE 0.9 % (FLUSH) 0.9 %
10 SYRINGE (ML) INJECTION PRN
Status: DISCONTINUED | OUTPATIENT
Start: 2018-10-29 | End: 2018-10-30 | Stop reason: HOSPADM

## 2018-10-29 RX ORDER — ASPIRIN 81 MG/1
324 TABLET, CHEWABLE ORAL ONCE
Status: COMPLETED | OUTPATIENT
Start: 2018-10-29 | End: 2018-10-29

## 2018-10-29 RX ADMIN — ALBUTEROL SULFATE 2 PUFF: 90 AEROSOL, METERED RESPIRATORY (INHALATION) at 20:18

## 2018-10-29 RX ADMIN — FUROSEMIDE 20 MG: 10 INJECTION, SOLUTION INTRAMUSCULAR; INTRAVENOUS at 11:27

## 2018-10-29 RX ADMIN — ASPIRIN 324 MG: 81 TABLET, CHEWABLE ORAL at 01:40

## 2018-10-29 RX ADMIN — VERAPAMIL HYDROCHLORIDE 240 MG: 240 TABLET, FILM COATED, EXTENDED RELEASE ORAL at 09:09

## 2018-10-29 RX ADMIN — ALBUTEROL SULFATE 2 PUFF: 90 AEROSOL, METERED RESPIRATORY (INHALATION) at 13:53

## 2018-10-29 RX ADMIN — MOMETASONE FUROATE AND FORMOTEROL FUMARATE DIHYDRATE 2 PUFF: 200; 5 AEROSOL RESPIRATORY (INHALATION) at 20:18

## 2018-10-29 RX ADMIN — MOMETASONE FUROATE AND FORMOTEROL FUMARATE DIHYDRATE 2 PUFF: 200; 5 AEROSOL RESPIRATORY (INHALATION) at 08:48

## 2018-10-29 RX ADMIN — Medication 10 ML: at 22:18

## 2018-10-29 RX ADMIN — GABAPENTIN 200 MG: 100 CAPSULE ORAL at 21:27

## 2018-10-29 RX ADMIN — FUROSEMIDE 20 MG: 10 INJECTION, SOLUTION INTRAMUSCULAR; INTRAVENOUS at 01:40

## 2018-10-29 RX ADMIN — ALBUTEROL SULFATE 2 PUFF: 90 AEROSOL, METERED RESPIRATORY (INHALATION) at 08:48

## 2018-10-29 RX ADMIN — ASPIRIN 81 MG: 81 TABLET, CHEWABLE ORAL at 08:54

## 2018-10-29 RX ADMIN — METOPROLOL TARTRATE 25 MG: 50 TABLET, FILM COATED ORAL at 01:40

## 2018-10-29 RX ADMIN — METOPROLOL TARTRATE 25 MG: 25 TABLET ORAL at 08:54

## 2018-10-29 RX ADMIN — Medication 10 ML: at 08:55

## 2018-10-29 ASSESSMENT — ENCOUNTER SYMPTOMS
COLOR CHANGE: 0
DIARRHEA: 0
COUGH: 1
CHEST TIGHTNESS: 1
SHORTNESS OF BREATH: 1
WHEEZING: 1
CONSTIPATION: 0
NAUSEA: 0
TROUBLE SWALLOWING: 0
ABDOMINAL PAIN: 0
EYE REDNESS: 0
VOMITING: 0

## 2018-10-29 ASSESSMENT — PAIN SCALES - GENERAL: PAINLEVEL_OUTOF10: 0

## 2018-10-29 NOTE — H&P
Immature Granulocytes 0 0 %    Segs Absolute 3.96 1.50 - 8.10 k/uL    Absolute Lymph # 2.55 1.10 - 3.70 k/uL    Absolute Mono # 0.70 0.10 - 1.20 k/uL    Absolute Eos # 0.46 (H) 0.00 - 0.44 k/uL    Basophils # 0.05 0.00 - 0.20 k/uL    Absolute Immature Granulocyte 0.03 0.00 - 0.30 k/uL    WBC Morphology NOT REPORTED     RBC Morphology NOT REPORTED     Platelet Estimate NOT REPORTED    Troponin    Collection Time: 10/29/18 12:14 AM   Result Value Ref Range    Troponin T <0.03 <0.03 ng/mL    Troponin Interp         POCT troponin    Collection Time: 10/29/18 12:21 AM   Result Value Ref Range    POC Troponin I 0.13 (HH) 0.00 - 0.10 ng/mL    POC Troponin Interp       The Troponin-I (POC) results cannot be compared to the Troponin-T results. Calcium, Ionized    Collection Time: 10/29/18 12:29 AM   Result Value Ref Range    Calcium, Ion 1.15 1.13 - 1.33 mmol/L       Imaging/Diagnostics:    TWO VIEWS OF THE CHEST 10/29/2018 12:16 am  FINDINGS:  Stable cardiomegaly and pulmonary vascular congestion. No pleural effusion,  focal consolidation or pneumothorax. No acute osseous abnormality. Impression:    Stable cardiomegaly with pulmonary vascular congestion. Echocardiography Report (TTE)  07/06/2016  Left ventricular chamber dimension is normal in size with severe concentric  left ventricular hypertrophy, hyperdynamic systolic function, systolic  anterior motion of the MV, and EF of >60%=hypertrophic obstructive  cardiomyopathy. Grade II (moderate) left ventricular diastolic dysfunction. Mid-systolic notch of aortic valve opening associated with left ventricular  outflow tract obstruction. Aortic valve structure is normal without  stenosis. Mild mitral regurgitation. No significant pericardial effusion is seen.         Assessment :      Primary Problem  HOCM (hypertrophic obstructive cardiomyopathy) [facility])    Active Hospital Problems    Diagnosis Date Noted    HOCM (hypertrophic obstructive cardiomyopathy) ([facility]

## 2018-10-29 NOTE — ED PROVIDER NOTES
well as discussion with cardiology. Patient accepted for admission. No additional recommendations or request per internal medicine. [RB]      ED Course User Index  [RB] Ghazala Adkins MD     Patient admitted and transported to floor in no acute distress. Patient remained stable throughout the entirety of the ED visit while under my care. PROCEDURES:  None    CONSULTS:  None    CRITICAL CARE:  None    FINAL IMPRESSION      1. Congestive heart failure, unspecified HF chronicity, unspecified heart failure type (HCC)    2. Leg edema    3. Dyspnea, unspecified type          DISPOSITION / PLAN     DISPOSITION      Discharged    PATIENT REFERRED TO:  No follow-up provider specified.     DISCHARGE MEDICATIONS:  Current Discharge Medication List          Ghazala Adkins DO  Emergency Medicine Resident    (Please note that portions of thisnote were completed with a voice recognition program.  Efforts were made to edit the dictations but occasionally words are mis-transcribed.)     Ghazala Adkins MD  10/29/18 9022

## 2018-10-29 NOTE — CARE COORDINATION
Case Management Initial Discharge Plan  Belen Blackburn,             Met with:patient to discuss discharge plans. Information verified: address, contacts, phone number, , insurance Yes  PCP: No primary care provider on file. Date of last visit: needs PCP agreeable for me to obtain appt no preference prefer afternoon appt    Insurance Provider none    Discharge Planning    Living Arrangements:  Family Members (lives with Uncle)   Support Systems:  Friends/Neighbors, Family Members, Parent    Home has one level stories  4-5  stairs to climb to get into front door,   States he lives with Mary Prasad which was not the address on the face sheet would not give address of Uncle    Patient able to perform ADL's:Independent    Current Services (outpatient & in home) none  DME equipment: none  DME provider: N/A    [facility] [place]   Potential Assistance Purchasing Medications:  Yes  Does patient want to participate in local refill/ meds to beds program?  Yes    Potential Assistance Needed:  Home Care    Patient agreeable to home care: No  [place] of choice provided:  n/a    Prior SNF/Rehab Placement and Facility: none  Agreeable to SNF/Rehab: No  [place] of choice provided: no   Evaluation: no will need    Expected Discharge date:  10/31/18  Patient expects to be discharged to:  home  Follow Up Appointment: Best Day/ Time:      Transportation provider: danuta Marshall [phone]  Transportation arrangements needed for discharge: No    Readmission Risk              Risk of Unplanned Readmission:        19             Does patient have a readmission risk score greater than 14?: Yes  If yes, follow-up appointment must be made within 7 days of discharge.      Discharge Plan: home may need [facility] [place]. assist with meds flu appt made [facility]  at 2:15pm          Electronically signed by Ashli Gale RN on 10/29/18 at 10:30 AM

## 2018-10-29 NOTE — CONSULTS
Attestation signed by      Attending Physician Statement:    I have discussed the care of  Linsey Aguirre , including pertinent history and exam findings, with the Cardiology fellow/resident. I have seen and examined the patient and the key elements of all parts of the encounter have been performed by me. I agree with the assessment, plan and orders as documented by the fellow/resident, after I modified exam findings and plan of treatments, and the final version is my approved version of the assessment. Additional Comments:   Acute CHF in exacerbation- likely diastolic complicated by HOCM and Cocaine use  - plan for one more dose of IV lasix  - Await 2d echo  - needs to avoid cocaine use  - continue verapamil   - need be very cautious as he is likely preload dependant due to HOCM. Thank you for allowing me to participate in the care of this patient, please do not hesitate to call if you have any questions. Annette Calix DO, Ani Camargo, [address] [facility]  [facility]. [employer]  [phone]                  [facility] Cardiology    Consult / H&P               Today's Date: 10/29/2018  Patient Name: Linsey Aguirre  Date of admission: 10/28/2018 11:25 PM  Patient's age: 35 y. o., 1985  Admission Dx: [facility]M [facility]hypertrophic obstructive [facility]) ([facility] [I42.1]    Reason for Consult:  Cardiac evaluation    Requesting Physician: Jeimy Clinton DO    CHIEF COMPLAINT:  Chest pain and leg swelling    History Obtained From:  patient, electronic medical record    HISTORY OF PRESENT ILLNESS:      The patient is a 35 y.o.  male who is admitted to the hospital for chest pain and worsening leg swelling bilaterally. Patient has PMH of HOCM, noncompliance to medications and cocaine use. He states he has not taken his verapamil for several months. He states he still uses cocaine with last use 10 days ago.  He came in to ER with pressure like left sided chest pain, leg oriented. · Moves all extremities well  · No abnormalities of mood, affect, memory, mentation, or behavior are noted    DATA:    Diagnostics:      ECHO 7/6/16: EF 60%, HOCM, grade II DD, mild MR.      CATH 1/6/16: Normal coronaries. EF 70%. Labs:     CBC:   Recent Labs      10/29/18   0014   WBC  7.8   HGB  14.2   HCT  44.2   PLT  198     BMP:   Recent Labs      10/29/18   0014   NA  140   K  3.8   CO2  25   BUN  8   CREATININE  1.17   LABGLOM  >60   GLUCOSE  117*     BNP: No results for input(s): BNP in the last 72 hours. PT/INR: No results for input(s): PROTIME, INR in the last 72 hours. APTT:No results for input(s): APTT in the last 72 hours. CARDIAC ENZYMES:  Recent Labs      10/29/18   0021   TROPONINI  0.13*     FASTING LIPID PANEL:  Lab Results   Component Value Date    HDL 43 01/09/2013    TRIG 42 01/09/2013     LIVER PROFILE:No results for input(s): AST, ALT, LABALBU in the last 72 hours. IMPRESSION:    1. HOCM  2. Atypical chest pain likely due to demand ischemia from HOCM and drug use  3. Smoker  4. Asthma     RECOMMENDATIONS:  1. Discontinue beta blockers   2. Cont verapamil   3. Avoid too much lasix as will decrease preload. 4. 2D ECHO to be done today  5. Advised compliance to medication  6. counselled for drug and tobacco use   7. Will need follow up in clinic. Will discuss with rounding attending Dr. Terrie Grimaldo for final recommendations.     Hyun Adam MD  Internal Medicine Resident PGY3

## 2018-10-30 ENCOUNTER — APPOINTMENT (OUTPATIENT)
Dept: GENERAL RADIOLOGY | Age: 33
DRG: 194 | End: 2018-10-30
Payer: MEDICAID

## 2018-10-30 VITALS
HEART RATE: 97 BPM | RESPIRATION RATE: 16 BRPM | OXYGEN SATURATION: 98 % | BODY MASS INDEX: 27.33 KG/M2 | TEMPERATURE: 98.2 F | HEIGHT: 77 IN | WEIGHT: 231.5 LBS | SYSTOLIC BLOOD PRESSURE: 139 MMHG | DIASTOLIC BLOOD PRESSURE: 77 MMHG

## 2018-10-30 LAB
ANION GAP SERPL CALCULATED.3IONS-SCNC: 10 MMOL/L (ref 9–17)
BNP INTERPRETATION: ABNORMAL
BUN BLDV-MCNC: 12 MG/DL (ref 6–20)
BUN/CREAT BLD: NORMAL (ref 9–20)
CALCIUM SERPL-MCNC: 9 MG/DL (ref 8.6–10.4)
CHLORIDE BLD-SCNC: 101 MMOL/L (ref 98–107)
CHOLESTEROL/HDL RATIO: 3.6
CHOLESTEROL: 201 MG/DL
CO2: 28 MMOL/L (ref 20–31)
CREAT SERPL-MCNC: 1.12 MG/DL (ref 0.7–1.2)
GFR AFRICAN AMERICAN: >60 ML/MIN
GFR NON-AFRICAN AMERICAN: >60 ML/MIN
GFR SERPL CREATININE-BSD FRML MDRD: NORMAL ML/MIN/{1.73_M2}
GFR SERPL CREATININE-BSD FRML MDRD: NORMAL ML/MIN/{1.73_M2}
GLUCOSE BLD-MCNC: 95 MG/DL (ref 70–99)
HCT VFR BLD CALC: 43.4 % (ref 40.7–50.3)
HDLC SERPL-MCNC: 56 MG/DL
HEMOGLOBIN: 13.9 G/DL (ref 13–17)
LDL CHOLESTEROL: 122 MG/DL (ref 0–130)
MAGNESIUM: 2.1 MG/DL (ref 1.6–2.6)
MCH RBC QN AUTO: 30 PG (ref 25.2–33.5)
MCHC RBC AUTO-ENTMCNC: 32 G/DL (ref 28.4–34.8)
MCV RBC AUTO: 93.5 FL (ref 82.6–102.9)
NRBC AUTOMATED: 0 PER 100 WBC
PDW BLD-RTO: 13.9 % (ref 11.8–14.4)
PLATELET # BLD: 193 K/UL (ref 138–453)
PMV BLD AUTO: 12.2 FL (ref 8.1–13.5)
POTASSIUM SERPL-SCNC: 4.2 MMOL/L (ref 3.7–5.3)
PRO-BNP: 1215 PG/ML
RBC # BLD: 4.64 M/UL (ref 4.21–5.77)
SODIUM BLD-SCNC: 139 MMOL/L (ref 135–144)
TRIGL SERPL-MCNC: 116 MG/DL
TSH SERPL DL<=0.05 MIU/L-ACNC: 1.3 MIU/L (ref 0.3–5)
VLDLC SERPL CALC-MCNC: ABNORMAL MG/DL (ref 1–30)
WBC # BLD: 7.9 K/UL (ref 3.5–11.3)

## 2018-10-30 PROCEDURE — 94760 N-INVAS EAR/PLS OXIMETRY 1: CPT

## 2018-10-30 PROCEDURE — 99406 BEHAV CHNG SMOKING 3-10 MIN: CPT

## 2018-10-30 PROCEDURE — 36415 COLL VENOUS BLD VENIPUNCTURE: CPT

## 2018-10-30 PROCEDURE — 94640 AIRWAY INHALATION TREATMENT: CPT

## 2018-10-30 PROCEDURE — 2580000003 HC RX 258: Performed by: NURSE PRACTITIONER

## 2018-10-30 PROCEDURE — 85027 COMPLETE CBC AUTOMATED: CPT

## 2018-10-30 PROCEDURE — 83735 ASSAY OF MAGNESIUM: CPT

## 2018-10-30 PROCEDURE — G0378 HOSPITAL OBSERVATION PER HR: HCPCS

## 2018-10-30 PROCEDURE — 83036 HEMOGLOBIN GLYCOSYLATED A1C: CPT

## 2018-10-30 PROCEDURE — 6370000000 HC RX 637 (ALT 250 FOR IP): Performed by: NURSE PRACTITIONER

## 2018-10-30 PROCEDURE — 83880 ASSAY OF NATRIURETIC PEPTIDE: CPT

## 2018-10-30 PROCEDURE — 84443 ASSAY THYROID STIM HORMONE: CPT

## 2018-10-30 PROCEDURE — 80061 LIPID PANEL: CPT

## 2018-10-30 PROCEDURE — 71046 X-RAY EXAM CHEST 2 VIEWS: CPT

## 2018-10-30 PROCEDURE — 80048 BASIC METABOLIC PNL TOTAL CA: CPT

## 2018-10-30 PROCEDURE — 99238 HOSP IP/OBS DSCHRG MGMT 30/<: CPT | Performed by: FAMILY MEDICINE

## 2018-10-30 RX ORDER — VERAPAMIL HYDROCHLORIDE 240 MG/1
240 TABLET, FILM COATED, EXTENDED RELEASE ORAL DAILY
Qty: 30 TABLET | Refills: 3 | Status: SHIPPED | OUTPATIENT
Start: 2018-10-30

## 2018-10-30 RX ORDER — ASPIRIN 81 MG/1
81 TABLET, CHEWABLE ORAL DAILY
Qty: 30 TABLET | Refills: 0 | Status: SHIPPED | OUTPATIENT
Start: 2018-10-30

## 2018-10-30 RX ORDER — CETIRIZINE HYDROCHLORIDE 10 MG/1
10 TABLET ORAL DAILY
Qty: 30 TABLET | Refills: 0 | Status: SHIPPED | OUTPATIENT
Start: 2018-10-30 | End: 2018-11-05

## 2018-10-30 RX ORDER — LISINOPRIL 5 MG/1
5 TABLET ORAL DAILY
Qty: 30 TABLET | Refills: 3 | Status: SHIPPED | OUTPATIENT
Start: 2018-10-31

## 2018-10-30 RX ADMIN — Medication 10 ML: at 09:19

## 2018-10-30 RX ADMIN — ALBUTEROL SULFATE 2 PUFF: 90 AEROSOL, METERED RESPIRATORY (INHALATION) at 13:40

## 2018-10-30 RX ADMIN — MOMETASONE FUROATE AND FORMOTEROL FUMARATE DIHYDRATE 2 PUFF: 200; 5 AEROSOL RESPIRATORY (INHALATION) at 08:35

## 2018-10-30 RX ADMIN — ASPIRIN 81 MG: 81 TABLET, CHEWABLE ORAL at 12:08

## 2018-10-30 RX ADMIN — ALBUTEROL SULFATE 2 PUFF: 90 AEROSOL, METERED RESPIRATORY (INHALATION) at 08:35

## 2018-10-30 RX ADMIN — LISINOPRIL 5 MG: 5 TABLET ORAL at 09:18

## 2018-10-30 RX ADMIN — VERAPAMIL HYDROCHLORIDE 240 MG: 240 TABLET, FILM COATED, EXTENDED RELEASE ORAL at 09:19

## 2018-10-30 NOTE — CARE COORDINATION
Transitional Planning  Plan is home his friend Gurpreet Fields will be his ride [phone]. To flu with Cardiology in two weeks. Obtained him a new PCP with FLU appt in [place].  He may need med assist, did note in payor which was blank now reads pending medicaid.

## 2018-10-30 NOTE — DISCHARGE SUMMARY
[facility]    Discharge Summary     Patient ID: Anum Paz  :  1985   MRN: [id number]     ACCOUNT:  [de-identified]   Patient's PCP: No primary care provider on file. Admit Date: 10/28/2018   Discharge Date: 10/30/2018     Length of Stay: 1  Code Status:  Full Code  Admitting Physician: Grace Clinton, DO  Discharge Physician: Venice Prasad MD     Active Discharge Diagnoses:     Hospital Problem Lists:  Principal Problem:    HOCM (hypertrophic obstructive cardiomyopathy) ([facility]  Active Problems:    Cocaine abuse ([facility]    Cigarette smoker since last 15 years    Chest pain    Lower extremity edema  Resolved Problems:    * No resolved hospital problems. *      Admission Condition:  fair     Discharged Condition: good    Hospital Stay:     Hospital Course: The patient is a 35 y.o.  Non-/non  male who presents with Chest Pain and Leg Swelling   and he is admitted to the hospital for the management of  Diastolic CHF complicated by HOCM. Patient presented to ER with worsening Swelling and also progressively worsening chest tightness and shortness of breath over the last 1 week associated with fatigue and decrease level of energy.   Known history of HOCM, does not see cardiology or primary care and noncompliant present medications. She denies any  nausea, vomiting, fever or chills, denies any recent URI symptoms.   In ER POC trops came back elevated but repeat Trop T was negative , CXR show cardiomegaly and Pulm vasc congestion.        Significant therapeutic interventions:     Acute/chronic diastolic CHF:  improved, Cardiology gave one dose of Lasix on 10/29, Echocardiogram reviewed      Trops, unremarkable  Smoking cessation education  Urine drug screen came back positive for cocaine and THC  Check electrolytes and replace as needed  Resume other home meds  DVT and GI PPx     Will discharge when arrangements complete

## 2018-10-30 NOTE — PROGRESS NOTES
BRONCHOSPASM/BRONCHOCONSTRICTION     [x]         IMPROVE AERATION/BREATH SOUNDS  [x]   ADMINISTER BRONCHODILATOR THERAPY AS APPROPRIATE  [x]   ASSESS BREATH SOUNDS  [x]   IMPLEMENT AEROSOL/MDI PROTOCOL  [x]   PATIENT EDUCATION AS NEEDED
Brandon Ocampo SR, PPatient Assessment complete. HOCM (hypertrophic obstructive [facility]) ([facility] [I42.1] . Vitals:    10/29/18 0315   BP: 131/76   Pulse: 94   Resp: 18   Temp: 97.8 °F (36.6 °C)   SpO2: 98%   . Patients home meds are   Prior to Admission medications    Medication Sig Start Date End Date Taking? Authorizing Provider   predniSONE (DELTASONE) 50 MG tablet Take 1 tablet by mouth daily 8/4/17   Jennifer Campbell MD   albuterol sulfate HFA (PROVENTIL HFA) 108 (90 Base) MCG/ACT inhaler Inhale 1-2 puffs into the lungs every 4 hours as needed for Wheezing or Shortness of Breath Space out to every 6 hours as symptoms improve. 8/4/17   Jennifer Campbell MD   aspirin 81 MG chewable tablet Take 1 tablet by mouth daily 8/4/17   Jennifer Campbell MD   beclomethasone (QVAR) 40 MCG/ACT inhaler Inhale 2 puffs into the lungs 2 times daily 8/4/17   Jennifer Campbell MD   cetirizine (ZYRTEC ALLERGY) 10 MG tablet Take 1 tablet by mouth daily 8/4/17   Jennifer Campbell MD   gabapentin (NEURONTIN) 100 MG capsule Take 2 capsules by mouth nightly 8/4/17   Jennifer Campbell MD   verapamil (CALAN SR) 240 MG extended release tablet Take 1 tablet by mouth daily 8/4/17   Jennifer Campbell MD   azithromycin (ZITHROMAX) 250 MG tablet Take 1 tablet by mouth daily 5/22/17   Mayuri Luque DO   gabapentin (NEURONTIN) 100 MG capsule Take 200 mg by mouth nightly    Historical Provider, MD   beclomethasone (QVAR) 40 MCG/ACT inhaler Inhale 2 puffs into the lungs 2 times daily 7/1/16   Lauren Larry DO   . Recent Surgical History: None = 0     Assessment   Received pt on RA, awake, no distress noted.  Pt states he smokes 1pk/day and has asthma, uses albuterol PRN at home    RR 18  Breath Sounds: clear apices, scattered wheezes in bases      · Bronchodilator assessment at level  2  ·   ·   · [x]    Bronchodilator Assessment  BRONCHODILATOR ASSESSMENT SCORE  Score 0 1 2 3 4 5   Breath Sounds   []  Patient Baseline []  No Wheeze good aeration [x]  Faint, scattered
Cardiac Testing      ECHO 7/6/16: EF 60%, HOCM, grade II DD, mild MR.      CATH 1/6/16: Normal coronaries. EF 70%.
Echo reviewed by Kelley Schmidt NP  No further testing   Ok to discharge from Cardiology standpoint with outpatient follow up in 2 weeks.
Inhaler / Aerosol Education        [x] Served spacer    [] Provided and reviewed booklet   [x] Good return demonstration per patient   [] Aerosolized Medications:     Verbal education has been provided in the use, benefits and possible adverse reactions of aerosolized medications used in the treatment of this patient.     [] Other:
vomiting  Neurological:  negative for dizziness, headache    Medications: Allergies: Allergies   Allergen Reactions    Epinephrine Other (See Comments)      been told by cardiologist as a child that he should never have it.  Niacin And Related      Never had, been told to never take it by doctor.  Seasonal        Current Meds:   Scheduled Meds:    aspirin  81 mg Oral Daily    gabapentin  200 mg Oral Nightly    verapamil  240 mg Oral Daily    sodium chloride flush  10 mL Intravenous 2 times per day    docusate sodium  100 mg Oral BID    lisinopril  5 mg Oral Daily    heparin (porcine)  5,000 Units Subcutaneous 3 times per day    mometasone-formoterol  2 puff Inhalation BID    albuterol sulfate HFA  2 puff Inhalation TID     Continuous Infusions:   PRN Meds: sodium chloride flush, magnesium hydroxide, bisacodyl, ondansetron, nicotine, acetaminophen, albuterol sulfate HFA    Data:     Past Medical History:   has a past medical history of DI (acute kidney injury) ([facility] Asthma; Cardiomyopathy ([facility] Cocaine abuse ([facility] Foreign body in ear; Heart attack ([facility] Hypertension; Hypokalemia; and Substance abuse ([facility] Social History:   reports that he has been smoking Cigarettes. He has a 12.00 pack-year smoking history. He has never used smokeless tobacco. He reports that he drinks alcohol. He reports that he uses drugs, including Marijuana and Cocaine. Family History:   Family History   Problem Relation Age of Onset    Diabetes Other     Hypertension Other     Heart Disease Other     Diabetes Mother        Vitals:  /77   Pulse 97   Temp 98.2 °F (36.8 °C) (Oral)   Resp 16   Ht 6' 5\" (1.956 m)   Wt 231 lb 8 oz (105 kg)   SpO2 98%   BMI 27.45 kg/m²   Temp (24hrs), Av.2 °F (36.8 °C), Min:98.2 °F (36.8 °C), Max:98.2 °F (36.8 °C)    No results for input(s): POCGLU in the last 72 hours. I/O (24Hr):     Intake/Output Summary (Last 24 hours) at 10/30/18 9680  Last data filed

## 2018-10-31 LAB
ESTIMATED AVERAGE GLUCOSE: 108 MG/DL
HBA1C MFR BLD: 5.4 % (ref 4–6)

## 2018-11-05 ENCOUNTER — OFFICE VISIT (OUTPATIENT)
Dept: INTERNAL MEDICINE | Age: 33
End: 2018-11-05
Payer: MEDICAID

## 2018-11-05 VITALS
HEART RATE: 85 BPM | DIASTOLIC BLOOD PRESSURE: 70 MMHG | HEIGHT: 78 IN | WEIGHT: 233.6 LBS | SYSTOLIC BLOOD PRESSURE: 127 MMHG | OXYGEN SATURATION: 100 % | BODY MASS INDEX: 27.03 KG/M2

## 2018-11-05 DIAGNOSIS — I42.1 HOCM (HYPERTROPHIC OBSTRUCTIVE CARDIOMYOPATHY) (HCC): ICD-10-CM

## 2018-11-05 DIAGNOSIS — J45.20 MILD INTERMITTENT ASTHMA WITHOUT COMPLICATION: Primary | ICD-10-CM

## 2018-11-05 DIAGNOSIS — Z83.3 FAMILY HISTORY OF DIABETES MELLITUS (DM): ICD-10-CM

## 2018-11-05 PROBLEM — R07.9 CHEST PAIN: Status: RESOLVED | Noted: 2017-11-21 | Resolved: 2018-11-05

## 2018-11-05 PROBLEM — Z91.199 NONCOMPLIANCE: Status: RESOLVED | Noted: 2017-11-21 | Resolved: 2018-11-05

## 2018-11-05 PROBLEM — R60.0 LOWER EXTREMITY EDEMA: Status: RESOLVED | Noted: 2018-10-29 | Resolved: 2018-11-05

## 2018-11-05 PROBLEM — J45.909 MODERATE ASTHMA WITHOUT COMPLICATION: Status: RESOLVED | Noted: 2018-11-05 | Resolved: 2018-11-05

## 2018-11-05 PROBLEM — J45.909 MODERATE ASTHMA WITHOUT COMPLICATION: Status: ACTIVE | Noted: 2018-11-05

## 2018-11-05 LAB — HBA1C MFR BLD: 5.5 %

## 2018-11-05 PROCEDURE — 99203 OFFICE O/P NEW LOW 30 MIN: CPT | Performed by: INTERNAL MEDICINE

## 2018-11-05 PROCEDURE — 99211 OFF/OP EST MAY X REQ PHY/QHP: CPT | Performed by: INTERNAL MEDICINE

## 2018-11-05 PROCEDURE — 83036 HEMOGLOBIN GLYCOSYLATED A1C: CPT | Performed by: INTERNAL MEDICINE

## 2018-11-05 RX ORDER — ALBUTEROL SULFATE 90 UG/1
1-2 AEROSOL, METERED RESPIRATORY (INHALATION) EVERY 4 HOURS PRN
Qty: 1 INHALER | Refills: 1 | Status: SHIPPED | OUTPATIENT
Start: 2018-11-05 | End: 2021-06-01 | Stop reason: SDUPTHER

## 2018-11-05 ASSESSMENT — PATIENT HEALTH QUESTIONNAIRE - PHQ9
SUM OF ALL RESPONSES TO PHQ9 QUESTIONS 1 & 2: 0
2. FEELING DOWN, DEPRESSED OR HOPELESS: 0
1. LITTLE INTEREST OR PLEASURE IN DOING THINGS: 0
SUM OF ALL RESPONSES TO PHQ QUESTIONS 1-9: 0
SUM OF ALL RESPONSES TO PHQ QUESTIONS 1-9: 0

## 2018-11-05 NOTE — PATIENT INSTRUCTIONS
An appt with Dr. Mary Newberry Cardiologist scheduled 11/28/18 at 2:45 pm --address is on your referral    Return appointment card and Summary of Care was reviewed and copy was given to the patient.   MARA

## 2018-11-05 NOTE — PROGRESS NOTES
[facility] [address] [facility]  [address]  Dept: [phone]  Dept Fax: [phone]    New Patient Visit Note  Date of patient's visit: 11/5/2018  Patient's Name:  Michael العراقي YOB: 1985            Patient Care Team:  Pura Gonzalez MD as PCP - General (Internal Medicine)  ______________________________________________________________________    Reason for visit: Establish care/Preventative care  ______________________________________________________________________  Chief Compliant   New Patient; Follow-Up from Hospital ([facility] [place] 10/28- CHF Sx's ); and Discuss Medications (pt was prescribed lisinopril from ED but doesnt want to take )      ______________________________________________________________________  History of Presenting Illness:  History was obtained from the patient. Michael العراقي is a 35 y.o. is here to establish care. Most care over the past many years has been received in the ER. Patient also has had multiple hospital admissions because of lack of follow-up with her primary care doctor. He has history of HOCM and has been evaluated by cardiologist in the past.  He was given a LifeVest 2 years ago and never followed up. He was recently seen in the hospital for congestive heart failure with acute kidney injury and was treated cyst externally. He was discharged home on lisinopril and verapamil. He is not on any Lasix. He denies any shortness of breath or lower extremity edema. He also has history of asthma and currently on Qvar and albuterol inhaler. He has extensive history of cocaine abuse. Last used was about 18 days ago. Patient reported that he has quit. His only concern today is diabetes.   He reports family history of diabetes and would like to be screened for diabetes today.    ______________________________________________________________________  Past Medical/Surgical History: Diagnosis Date    DI (acute kidney injury) ([facility] 9/21/2015    Asthma     Cardiomyopathy ([facility]     Cocaine abuse ([facility]     Foreign body in ear 6/27/2014    Percocet pill    Heart attack ([facility] 2007    has had 2 heart attacks    Hypertension     Hypokalemia 6/27/2014    Substance abuse ([facility]     Cocaine       History reviewed. No pertinent surgical history. ______________________________________________________________________  Health Maintenance Due   Topic Date Due    HIV screen  10/20/2000    DTaP/Tdap/Td vaccine (1 - Tdap) 10/20/2004    Pneumococcal med risk (1 of 1 - PPSV23) 10/20/2004    Flu vaccine (1) 09/01/2018       Allergies   Allergen Reactions    Epinephrine Other (See Comments)      been told by cardiologist as a child that he should never have it.  Niacin And Related      Never had, been told to never take it by doctor.  Seasonal          Current Outpatient Prescriptions   Medication Sig Dispense Refill    albuterol sulfate HFA (PROVENTIL HFA) 108 (90 Base) MCG/ACT inhaler Inhale 1-2 puffs into the lungs every 4 hours as needed for Wheezing or Shortness of Breath Space out to every 6 hours as symptoms improve. 1 Inhaler 1    aspirin 81 MG chewable tablet Take 1 tablet by mouth daily 30 tablet 0    beclomethasone (QVAR) 40 MCG/ACT inhaler Inhale 2 puffs into the lungs 2 times daily 1 Inhaler 2    verapamil (CALAN SR) 240 MG extended release tablet Take 1 tablet by mouth daily 30 tablet 3    lisinopril (PRINIVIL;ZESTRIL) 5 MG tablet Take 1 tablet by mouth daily 30 tablet 3     No current facility-administered medications for this visit. Social History   Substance Use Topics    Smoking status: Current Every Day Smoker     Packs/day: 1.00     Years: 12.00     Types: Cigarettes    Smokeless tobacco: Never Used    Alcohol use Yes      Comment: drinks socially;         Family History   Problem Relation Age of Onset    Diabetes Other     Hypertension Other     Heart intending to generate a document that actually reflects the content of the visit, the document can still have some mistakes which may not have been identified and corrected by editing.

## 2019-01-09 ENCOUNTER — HOSPITAL ENCOUNTER (EMERGENCY)
Age: 34
Discharge: HOME | End: 2019-01-09
Attending: EMERGENCY MEDICINE
Payer: MEDICARE

## 2019-01-09 VITALS
HEIGHT: 78 IN | SYSTOLIC BLOOD PRESSURE: 123 MMHG | BODY MASS INDEX: 26.61 KG/M2 | OXYGEN SATURATION: 97 % | RESPIRATION RATE: 16 BRPM | HEART RATE: 77 BPM | DIASTOLIC BLOOD PRESSURE: 76 MMHG | WEIGHT: 230 LBS | TEMPERATURE: 98.3 F

## 2019-01-09 DIAGNOSIS — R19.7 DIARRHEA, UNSPECIFIED TYPE: Primary | ICD-10-CM

## 2019-01-09 PROCEDURE — 6370000000 HC RX 637 (ALT 250 FOR IP): Performed by: STUDENT IN AN ORGANIZED HEALTH CARE EDUCATION/TRAINING PROGRAM

## 2019-01-09 PROCEDURE — 99283 EMERGENCY DEPT VISIT LOW MDM: CPT

## 2019-01-09 RX ORDER — LOPERAMIDE HYDROCHLORIDE 2 MG/1
2 CAPSULE ORAL 4 TIMES DAILY PRN
Qty: 5 CAPSULE | Refills: 0 | Status: SHIPPED | OUTPATIENT
Start: 2019-01-09 | End: 2019-01-19

## 2019-01-09 RX ORDER — DICYCLOMINE HYDROCHLORIDE 10 MG/1
10 CAPSULE ORAL ONCE
Status: COMPLETED | OUTPATIENT
Start: 2019-01-09 | End: 2019-01-09

## 2019-01-09 RX ADMIN — DICYCLOMINE HYDROCHLORIDE 10 MG: 10 CAPSULE ORAL at 17:22

## 2019-01-09 ASSESSMENT — ENCOUNTER SYMPTOMS
ANAL BLEEDING: 0
RHINORRHEA: 0
ABDOMINAL PAIN: 0
VOMITING: 0
SHORTNESS OF BREATH: 0
DIARRHEA: 1
CONSTIPATION: 0
NAUSEA: 0
BLOOD IN STOOL: 0

## 2019-01-09 ASSESSMENT — PAIN SCALES - GENERAL: PAINLEVEL_OUTOF10: 8

## 2019-01-09 ASSESSMENT — PAIN DESCRIPTION - LOCATION: LOCATION: RECTUM

## 2019-01-09 ASSESSMENT — PAIN DESCRIPTION - PAIN TYPE: TYPE: ACUTE PAIN

## 2019-01-09 ASSESSMENT — PAIN DESCRIPTION - DESCRIPTORS: DESCRIPTORS: BURNING

## 2021-06-01 ENCOUNTER — APPOINTMENT (OUTPATIENT)
Dept: GENERAL RADIOLOGY | Age: 36
End: 2021-06-01
Payer: MEDICAID

## 2021-06-01 ENCOUNTER — HOSPITAL ENCOUNTER (EMERGENCY)
Age: 36
Discharge: HOME | End: 2021-06-01
Attending: EMERGENCY MEDICINE
Payer: MEDICAID

## 2021-06-01 VITALS
BODY MASS INDEX: 26.58 KG/M2 | OXYGEN SATURATION: 98 % | WEIGHT: 230 LBS | SYSTOLIC BLOOD PRESSURE: 116 MMHG | DIASTOLIC BLOOD PRESSURE: 95 MMHG | TEMPERATURE: 98.3 F | RESPIRATION RATE: 20 BRPM | HEART RATE: 97 BPM

## 2021-06-01 DIAGNOSIS — J45.20 MILD INTERMITTENT ASTHMA WITHOUT COMPLICATION: ICD-10-CM

## 2021-06-01 DIAGNOSIS — J45.901 SEVERE ASTHMA WITH EXACERBATION, UNSPECIFIED WHETHER PERSISTENT: Primary | ICD-10-CM

## 2021-06-01 LAB
ABSOLUTE EOS #: 0.4 K/UL (ref 0–0.44)
ABSOLUTE IMMATURE GRANULOCYTE: 0.03 K/UL (ref 0–0.3)
ABSOLUTE LYMPH #: 2.47 K/UL (ref 1.1–3.7)
ABSOLUTE MONO #: 0.92 K/UL (ref 0.1–1.2)
ANION GAP SERPL CALCULATED.3IONS-SCNC: 13 MMOL/L (ref 9–17)
BASOPHILS # BLD: 0 % (ref 0–2)
BASOPHILS ABSOLUTE: 0.03 K/UL (ref 0–0.2)
BUN BLDV-MCNC: 12 MG/DL (ref 6–20)
BUN/CREAT BLD: ABNORMAL (ref 9–20)
CALCIUM SERPL-MCNC: 9.2 MG/DL (ref 8.6–10.4)
CHLORIDE BLD-SCNC: 101 MMOL/L (ref 98–107)
CO2: 23 MMOL/L (ref 20–31)
CREAT SERPL-MCNC: 1.18 MG/DL (ref 0.7–1.2)
DIFFERENTIAL TYPE: NORMAL
EKG ATRIAL RATE: 110 BPM
EKG P AXIS: 74 DEGREES
EKG P-R INTERVAL: 144 MS
EKG Q-T INTERVAL: 358 MS
EKG QRS DURATION: 96 MS
EKG QTC CALCULATION (BAZETT): 484 MS
EKG R AXIS: 72 DEGREES
EKG T AXIS: 93 DEGREES
EKG VENTRICULAR RATE: 110 BPM
EOSINOPHILS RELATIVE PERCENT: 4 % (ref 1–4)
GFR AFRICAN AMERICAN: >60 ML/MIN
GFR NON-AFRICAN AMERICAN: >60 ML/MIN
GFR SERPL CREATININE-BSD FRML MDRD: ABNORMAL ML/MIN/{1.73_M2}
GFR SERPL CREATININE-BSD FRML MDRD: ABNORMAL ML/MIN/{1.73_M2}
GLUCOSE BLD-MCNC: 137 MG/DL (ref 70–99)
HCT VFR BLD CALC: 45.8 % (ref 40.7–50.3)
HEMOGLOBIN: 14.7 G/DL (ref 13–17)
IMMATURE GRANULOCYTES: 0 %
LYMPHOCYTES # BLD: 26 % (ref 24–43)
MCH RBC QN AUTO: 29.2 PG (ref 25.2–33.5)
MCHC RBC AUTO-ENTMCNC: 32.1 G/DL (ref 28.4–34.8)
MCV RBC AUTO: 90.9 FL (ref 82.6–102.9)
MONOCYTES # BLD: 10 % (ref 3–12)
NRBC AUTOMATED: 0 PER 100 WBC
PDW BLD-RTO: 13.7 % (ref 11.8–14.4)
PLATELET # BLD: 240 K/UL (ref 138–453)
PLATELET ESTIMATE: NORMAL
PMV BLD AUTO: 10.8 FL (ref 8.1–13.5)
POTASSIUM SERPL-SCNC: 3.7 MMOL/L (ref 3.7–5.3)
RBC # BLD: 5.04 M/UL (ref 4.21–5.77)
RBC # BLD: NORMAL 10*6/UL
SEG NEUTROPHILS: 60 % (ref 36–65)
SEGMENTED NEUTROPHILS ABSOLUTE COUNT: 5.5 K/UL (ref 1.5–8.1)
SODIUM BLD-SCNC: 137 MMOL/L (ref 135–144)
TROPONIN INTERP: ABNORMAL
TROPONIN T: ABNORMAL NG/ML
TROPONIN, HIGH SENSITIVITY: 36 NG/L (ref 0–22)
WBC # BLD: 9.4 K/UL (ref 3.5–11.3)
WBC # BLD: NORMAL 10*3/UL

## 2021-06-01 PROCEDURE — 6360000002 HC RX W HCPCS: Performed by: EMERGENCY MEDICINE

## 2021-06-01 PROCEDURE — 96365 THER/PROPH/DIAG IV INF INIT: CPT

## 2021-06-01 PROCEDURE — 71045 X-RAY EXAM CHEST 1 VIEW: CPT

## 2021-06-01 PROCEDURE — 80048 BASIC METABOLIC PNL TOTAL CA: CPT

## 2021-06-01 PROCEDURE — 99284 EMERGENCY DEPT VISIT MOD MDM: CPT

## 2021-06-01 PROCEDURE — 85025 COMPLETE CBC W/AUTO DIFF WBC: CPT

## 2021-06-01 PROCEDURE — 93010 ELECTROCARDIOGRAM REPORT: CPT | Performed by: INTERNAL MEDICINE

## 2021-06-01 PROCEDURE — 96375 TX/PRO/DX INJ NEW DRUG ADDON: CPT

## 2021-06-01 PROCEDURE — 94644 CONT INHLJ TX 1ST HOUR: CPT

## 2021-06-01 PROCEDURE — 84484 ASSAY OF TROPONIN QUANT: CPT

## 2021-06-01 PROCEDURE — 93005 ELECTROCARDIOGRAM TRACING: CPT | Performed by: GENERAL PRACTICE

## 2021-06-01 PROCEDURE — 94645 CONT INHLJ TX EACH ADDL HOUR: CPT

## 2021-06-01 PROCEDURE — 94640 AIRWAY INHALATION TREATMENT: CPT

## 2021-06-01 RX ORDER — ALBUTEROL SULFATE 90 UG/1
1-2 AEROSOL, METERED RESPIRATORY (INHALATION) EVERY 4 HOURS PRN
Qty: 1 INHALER | Refills: 1 | Status: SHIPPED | OUTPATIENT
Start: 2021-06-01

## 2021-06-01 RX ORDER — PREDNISONE 10 MG/1
TABLET ORAL
Qty: 20 TABLET | Refills: 0 | Status: SHIPPED | OUTPATIENT
Start: 2021-06-01 | End: 2021-06-11

## 2021-06-01 RX ORDER — METHYLPREDNISOLONE SODIUM SUCCINATE 125 MG/2ML
125 INJECTION, POWDER, LYOPHILIZED, FOR SOLUTION INTRAMUSCULAR; INTRAVENOUS ONCE
Status: COMPLETED | OUTPATIENT
Start: 2021-06-01 | End: 2021-06-01

## 2021-06-01 RX ORDER — MAGNESIUM SULFATE IN WATER 40 MG/ML
2000 INJECTION, SOLUTION INTRAVENOUS ONCE
Status: COMPLETED | OUTPATIENT
Start: 2021-06-01 | End: 2021-06-01

## 2021-06-01 RX ORDER — ALBUTEROL SULFATE 2.5 MG/3ML
2.5 SOLUTION RESPIRATORY (INHALATION)
Status: DISCONTINUED | OUTPATIENT
Start: 2021-06-01 | End: 2021-06-01 | Stop reason: HOSPADM

## 2021-06-01 RX ORDER — ALBUTEROL SULFATE 2.5 MG/3ML
15 SOLUTION RESPIRATORY (INHALATION)
Status: DISCONTINUED | OUTPATIENT
Start: 2021-06-01 | End: 2021-06-01 | Stop reason: HOSPADM

## 2021-06-01 RX ADMIN — ALBUTEROL SULFATE 2.5 MG: 2.5 SOLUTION RESPIRATORY (INHALATION) at 01:06

## 2021-06-01 RX ADMIN — METHYLPREDNISOLONE SODIUM SUCCINATE 125 MG: 125 INJECTION, POWDER, FOR SOLUTION INTRAMUSCULAR; INTRAVENOUS at 00:53

## 2021-06-01 RX ADMIN — ALBUTEROL SULFATE 15 MG: 2.5 SOLUTION RESPIRATORY (INHALATION) at 01:23

## 2021-06-01 RX ADMIN — ALBUTEROL SULFATE 2.5 MG: 2.5 SOLUTION RESPIRATORY (INHALATION) at 00:46

## 2021-06-01 RX ADMIN — MAGNESIUM SULFATE 2000 MG: 2 INJECTION INTRAVENOUS at 00:53

## 2021-06-01 RX ADMIN — IPRATROPIUM BROMIDE 0.25 MG: 0.5 SOLUTION RESPIRATORY (INHALATION) at 00:46

## 2021-06-01 NOTE — ED PROVIDER NOTES
[facility] ED  Emergency Department Encounter  EmergencyMedicine Resident     Pt Name:Jaswant Sánchez  MRN: [id number]  [place] 1985  Date of evaluation: 6/1/21  PCP:  No primary care provider on file. CHIEF COMPLAINT       Chief Complaint   Patient presents with    Shortness of Breath       HISTORY OF PRESENT ILLNESS  (Location/Symptom, Timing/Onset, Context/Setting, Quality, Duration, Modifying Factors, Severity.)      Paola Duenas is a 28 y.o. male who presents with increased shortness of breath and wheezing. Patient has severe asthma, has ran out of his home medications and mowed the yard today triggering an asthma exacerbation. He is currently working hard to breathe. PAST MEDICAL / SURGICAL / SOCIAL / FAMILY HISTORY      has a past medical history of DI (acute kidney injury) ([facility] Asthma, Cardiomyopathy ([facility] [address] [facility] Cocaine abuse ([facility] [address] Foreign body in ear, Heart attack ([address] Hypertension, Hypokalemia, and Substance abuse ([facility] Denies further past medical hx     has no past surgical history on file.   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     Employer: [employer]   Tobacco Use    Smoking status: Current Every Day Smoker     Packs/day: 1.00     Years: 12.00     Pack years: 12.00     Types: Cigarettes    Smokeless tobacco: Never Used   Substance and Sexual Activity    Alcohol use: Yes     Comment: drinks socially;     Drug use: Yes     Types: Marijuana, Cocaine     Comment: clean from cocaine since 10/20/2018; pt still smokes marijuana     Sexual activity: Yes   Other Topics Concern    Not on file   Social History Narrative    Not on file     Social Determinants of Health     Financial Resource Strain:     Difficulty of Paying Living Expenses:    Food Insecurity:     Worried About Running Out of Food in the Last Year:     [address] in the Last Year:    Transportation Needs:     Lack of Transportation (Medical):  Lack of Transportation (Non-Medical):    Physical Activity:     Days of Exercise per Week:     Minutes of Exercise per Session:    Stress:     Feeling of Stress :    Social Connections:     Frequency of Communication with Friends and Family:     Frequency of Social Gatherings with Friends and Family:     Attends Spiritism Services:     Active Member of Clubs or Organizations:     Attends Club or Organization Meetings:     Marital Status:    Intimate Partner Violence:     Fear of Current or Ex-Partner:     Emotionally Abused:     Physically Abused:     Sexually Abused:        Family History   Problem Relation Age of Onset    Diabetes Other     Hypertension Other     Heart Disease Other     Diabetes Mother        Allergies:  Epinephrine, Niacin and related, and Seasonal    Home Medications:  Prior to Admission medications    Medication Sig Start Date End Date Taking? Authorizing Provider   albuterol sulfate HFA (PROVENTIL HFA) 108 (90 Base) MCG/ACT inhaler Inhale 1-2 puffs into the lungs every 4 hours as needed for Wheezing or Shortness of Breath Space out to every 6 hours as symptoms improve.  6/1/21  Yes Kris Felipe,    beclomethasone (QVAR) 40 MCG/ACT inhaler Inhale 2 puffs into the lungs 2 times daily 6/1/21  Yes Jesenia Reyes DO   albuterol (PROVENTIL) (5 MG/ML) 0.5% nebulizer solution Take 0.5 mLs by nebulization every 4 hours as needed for Wheezing 6/1/21  Yes Anuradha Reyes DO   ipratropium (ATROVENT HFA) 17 MCG/ACT inhaler Inhale 2 puffs into the lungs every 6 hours 6/1/21  Yes Jesenia Reyes DO   predniSONE (DELTASONE) 10 MG tablet Take 4 tablets by mouth once daily for 5 days 6/1/21 6/11/21 Yes Jesenia Reyes DO   hydrocortisone (ANUSOL-HC) 2.5 % rectal cream Place Procedures    XR CHEST PORTABLE    CBC Auto Differential    Basic Metabolic Panel w/ Reflex to MG    Troponin    EKG 12 Lead       MEDICATIONS ORDERED:  Orders Placed This Encounter   Medications    DISCONTD: albuterol (PROVENTIL) nebulizer solution 2.5 mg    ipratropium (ATROVENT) 0.02 % nebulizer solution 0.25 mg    DISCONTD: albuterol (PROVENTIL) nebulizer solution 15 mg    DISCONTD: ipratropium (ATROVENT) 0.02 % nebulizer solution 0.25 mg    magnesium sulfate 2000 mg in 50 mL IVPB premix    methylPREDNISolone sodium (SOLU-MEDROL) injection 125 mg    albuterol sulfate HFA (PROVENTIL HFA) 108 (90 Base) MCG/ACT inhaler     Sig: Inhale 1-2 puffs into the lungs every 4 hours as needed for Wheezing or Shortness of Breath Space out to every 6 hours as symptoms improve.      Dispense:  1 Inhaler     Refill:  1    beclomethasone (QVAR) 40 MCG/ACT inhaler     Sig: Inhale 2 puffs into the lungs 2 times daily     Dispense:  1 Inhaler     Refill:  2    albuterol (PROVENTIL) (5 MG/ML) 0.5% nebulizer solution     Sig: Take 0.5 mLs by nebulization every 4 hours as needed for Wheezing     Dispense:  30 vial     Refill:  0    ipratropium (ATROVENT HFA) 17 MCG/ACT inhaler     Sig: Inhale 2 puffs into the lungs every 6 hours     Dispense:  1 Inhaler     Refill:  3    predniSONE (DELTASONE) 10 MG tablet     Sig: Take 4 tablets by mouth once daily for 5 days     Dispense:  20 tablet     Refill:  0           DIAGNOSTIC RESULTS / EMERGENCY DEPARTMENT COURSE / MDM     LABS:  Results for orders placed or performed during the hospital encounter of 06/01/21   CBC Auto Differential   Result Value Ref Range    WBC 9.4 3.5 - 11.3 k/uL    RBC 5.04 4.21 - 5.77 m/uL    Hemoglobin 14.7 13.0 - 17.0 g/dL    Hematocrit 45.8 40.7 - 50.3 %    MCV 90.9 82.6 - 102.9 fL    MCH 29.2 25.2 - 33.5 pg    MCHC 32.1 28.4 - 34.8 g/dL    RDW 13.7 11.8 - 14.4 %    Platelets 928 933 - 285 k/uL    MPV 10.8 8.1 - 13.5 fL    NRBC Automated 0.0 0.0 per 100 WBC    Differential Type NOT REPORTED     Seg Neutrophils 60 36 - 65 %    Lymphocytes 26 24 - 43 %    Monocytes 10 3 - 12 %    Eosinophils % 4 1 - 4 %    Basophils 0 0 - 2 %    Immature Granulocytes 0 0 %    Segs Absolute 5.50 1.50 - 8.10 k/uL    Absolute Lymph # 2.47 1.10 - 3.70 k/uL    Absolute Mono # 0.92 0.10 - 1.20 k/uL    Absolute Eos # 0.40 0.00 - 0.44 k/uL    Basophils Absolute 0.03 0.00 - 0.20 k/uL    Absolute Immature Granulocyte 0.03 0.00 - 0.30 k/uL    WBC Morphology NOT REPORTED     RBC Morphology NOT REPORTED     Platelet Estimate NOT REPORTED    Basic Metabolic Panel w/ Reflex to MG   Result Value Ref Range    Glucose 137 (H) 70 - 99 mg/dL    BUN 12 6 - 20 mg/dL    CREATININE 1.18 0.70 - 1.20 mg/dL    Bun/Cre Ratio NOT REPORTED 9 - 20    Calcium 9.2 8.6 - 10.4 mg/dL    Sodium 137 135 - 144 mmol/L    Potassium 3.7 3.7 - 5.3 mmol/L    Chloride 101 98 - 107 mmol/L    CO2 23 20 - 31 mmol/L    Anion Gap 13 9 - 17 mmol/L    GFR Non-African American >60 >60 mL/min    GFR African American >60 >60 mL/min    GFR Comment          GFR Staging NOT REPORTED    Troponin   Result Value Ref Range    Troponin, High Sensitivity 36 (H) 0 - 22 ng/L    Troponin T NOT REPORTED <0.03 ng/mL    Troponin Interp NOT REPORTED    EKG 12 Lead   Result Value Ref Range    Ventricular Rate 110 BPM    Atrial Rate 110 BPM    P-R Interval 144 ms    QRS Duration 96 ms    Q-T Interval 358 ms    QTc Calculation (Bazett) 484 ms    P Axis 74 degrees    R Axis 72 degrees    T Axis 93 degrees       RADIOLOGY:  None    EKG  None    All EKG's are interpreted by the Emergency Department Physician who either signs or Co-signs this chart in the absence of a cardiologist.    [address] MDM:  28 y.o. male who presents with acute asthma exacerbation. Patient treated with magnesium, Solu-Medrol, and continuous DuoNeb treatment. On reexamination his work of breathing is significantly improved.   Patient offered admission but declined stating he will come back if his symptoms worsen. All his medications were refilled               PROCEDURES:  None    CONSULTS:  None    CRITICAL CARE:  None    FINAL IMPRESSION      1. Severe asthma with exacerbation, unspecified whether persistent    2. Mild intermittent asthma without complication            DISPOSITION / PLAN     DISPOSITION Decision To Discharge 06/01/2021 01:38:19 AM      PATIENT REFERRED TO:  No follow-up provider specified.     DISCHARGE MEDICATIONS:  Discharge Medication List as of 6/1/2021  1:38 AM      START taking these medications    Details   albuterol (PROVENTIL) (5 MG/ML) 0.5% nebulizer solution Take 0.5 mLs by nebulization every 4 hours as needed for Wheezing, Disp-30 vial, R-0Print      ipratropium (ATROVENT HFA) 17 MCG/ACT inhaler Inhale 2 puffs into the lungs every 6 hours, Disp-1 Inhaler, R-3Print      predniSONE (DELTASONE) 10 MG tablet Take 4 tablets by mouth once daily for 5 days, Disp-20 tablet, R-0Print             Konstantin Watts DO  Emergency Medicine Resident    (Please note that portions of thisnote were completed with a voice recognition program.  Efforts were made to edit the dictations but occasionally words are mis-transcribed.)        Konstantin Watts DO  Resident  06/02/21 7040

## 2021-06-01 NOTE — ED NOTES
Pt to ED via triage c/o SOB. Pt reports hx of asthma with SOB for 3 days. Pt states he has been outside a lot recently and that typically causes the increased SOB. Pt denies exposure to covid. Pt denies CP, fever. RR even and labored. Pt at 93% on room air. RT notifed. Dr. Tamara Perdomo at bedside for evaluation. IV established. EKG performed.      María Carreon RN  06/01/21 9707

## 2021-06-01 NOTE — ED PROVIDER NOTES
[facility]     Emergency Department     Faculty Attestation    I performed a history and physical examination of the patient and discussed management with the resident. I have reviewed and agree with the residents findings including all diagnostic interpretations, and treatment plans as written at the time of my review. Any areas of disagreement are noted on the chart. I was personally present for the key portions of any procedures. I have documented in the chart those procedures where I was not present during the key portions. For Physician Assistant/ Nurse Practitioner cases/documentation I have personally evaluated this patient and have completed at least one if not all key elements of the E/M (history, physical exam, and MDM). Additional findings are as noted. This patient was evaluated in the Emergency Department for symptoms described in the history of present illness. The patient was evaluated in the context of the global COVID-19 pandemic, which necessitated consideration that the patient might be at risk for infection with the SARS-CoV-2 virus that causes COVID-19. Institutional protocols and algorithms that pertain to the evaluation of patients at risk for COVID-19 are in a state of rapid change based on information released by regulatory bodies including the CDC and federal and state organizations. These policies and algorithms were followed during the patient's care in the ED. Primary Care Physician: Pura Manzanares MD    History: This is a 28 y.o. male who presents to the Emergency Department with complaint of service. The patient presents emerged her complaint of shortness of breath been ongoing for the past couple days. Patient states he has run out of his preventive medication for his asthma. He does complain of a cough that is been nonproductive. Denies any fever, chills or sweats. Physical:   weight is 230 lb (104.3 kg).  His blood pressure is 162/98 (abnormal) and his pulse is 110. His respiration is 22 and oxygen saturation is 93%. Wheezing and diminished breath sounds auscultated throughout, heart tachycardic with a regular rhythm, he is having some retractions as well. Impression: Asthma exacerbation    Plan: Albuterol, Solu-Medrol, magnesium, chest x-ray, EKG, CBC, BMP, troponin      EKG Interpretation    Interpreted by me  Sinus tachycardia ventricular rate 110, normal OH interval, normal QRS duration, biatrial enlargement, lateral infarct, age undetermined, inferior infarct, age undetermined, prolonged QT corrected        CRITICAL CARE: There was a high probability of clinically significant/life threatening deterioration in this patient's condition which required my urgent intervention. Total critical care time was 15 minutes. This excludes any time for separately reportable procedures. (Please note that portions of this note were completed with a voice recognition program.  Efforts were made to edit the dictations but occasionally words are mis-transcribed.)    Jasmine Christine.  Domingo Valentin MD, [facility]  Attending Emergency Medicine Physician        Francesca Quezada MD  06/01/21 2112

## 2021-06-02 ENCOUNTER — CARE COORDINATION (OUTPATIENT)
Dept: CARE COORDINATION | Age: 36
End: 2021-06-02

## 2021-06-02 NOTE — CARE COORDINATION
Left voicemail message to return call to [phone] for ED/Covid outreach call. Dx:Severe asthma with exacerbation, unspecified whether persistent. No covid testing done. 73 M with history of Basal Cell Carcinoma of nose s/p surgical resection with nasal reconstruction who presents to the hospital with complaints of perianal rectal discharge and lower back pain x 4 weeks  Leukocytosis, no fever  Feculent perianal discharge and low back pain  CT with concern for possible L1 OM, perianal fistula, pulmonary nodules  Uncertain if active infection, concern that radiographic findings all due to metastatic malignancy  MR with ? suspicion for OM, collection  S/p IR biopsy 8/11 to vertebral lesion  BCXs NGTD  Overall,  1) L1 Spinal lesion/OM  - Possible OM  - Vanco 1g q 12 (monitor levels)--continue present dose, trough reviewed  - Ertapenem 1g q 24  - Continue above abx through 9/20/20; check CBC, BUN, Cr, LFTs, vanco level weekly--fax to [phone]  - Follow up in ID clinic in 4-5 weeks  2) Malignancy/Fistula, lung nodules with adenoCA  - F/U surgery  - Further care and management for malignancy per primary team  3) Leukocytosis  - Monitor CBC, monitor for fevers    Champ Segovia MD  Pager [phone]  After 5pm and on weekends call [phone]

## 2021-06-11 ENCOUNTER — HOSPITAL ENCOUNTER (EMERGENCY)
Age: 36
Discharge: HOME | End: 2021-06-11
Attending: EMERGENCY MEDICINE
Payer: MEDICAID

## 2021-06-11 VITALS
DIASTOLIC BLOOD PRESSURE: 78 MMHG | OXYGEN SATURATION: 100 % | WEIGHT: 230 LBS | BODY MASS INDEX: 27.16 KG/M2 | HEART RATE: 96 BPM | RESPIRATION RATE: 18 BRPM | HEIGHT: 77 IN | SYSTOLIC BLOOD PRESSURE: 131 MMHG

## 2021-06-11 DIAGNOSIS — A64 STI (SEXUALLY TRANSMITTED INFECTION): Primary | ICD-10-CM

## 2021-06-11 LAB
-: ABNORMAL
AMORPHOUS: ABNORMAL
BACTERIA: ABNORMAL
BILIRUBIN URINE: NEGATIVE
CASTS UA: ABNORMAL /LPF (ref 0–8)
COLOR: YELLOW
CRYSTALS, UA: ABNORMAL /HPF
EPITHELIAL CELLS UA: ABNORMAL /HPF (ref 0–5)
GLUCOSE URINE: NEGATIVE
KETONES, URINE: NEGATIVE
LEUKOCYTE ESTERASE, URINE: NEGATIVE
MUCUS: ABNORMAL
NITRITE, URINE: NEGATIVE
OTHER OBSERVATIONS UA: ABNORMAL
PH UA: 6 (ref 5–8)
PROTEIN UA: NEGATIVE
RBC UA: ABNORMAL /HPF (ref 0–4)
RENAL EPITHELIAL, UA: ABNORMAL /HPF
SPECIFIC GRAVITY UA: 1.01 (ref 1–1.03)
TRICHOMONAS: ABNORMAL
TURBIDITY: CLEAR
URINE HGB: ABNORMAL
UROBILINOGEN, URINE: NORMAL
WBC UA: ABNORMAL /HPF (ref 0–5)
YEAST: ABNORMAL

## 2021-06-11 PROCEDURE — 96372 THER/PROPH/DIAG INJ SC/IM: CPT

## 2021-06-11 PROCEDURE — 6360000002 HC RX W HCPCS: Performed by: STUDENT IN AN ORGANIZED HEALTH CARE EDUCATION/TRAINING PROGRAM

## 2021-06-11 PROCEDURE — 81001 URINALYSIS AUTO W/SCOPE: CPT

## 2021-06-11 PROCEDURE — 87591 N.GONORRHOEAE DNA AMP PROB: CPT

## 2021-06-11 PROCEDURE — 99284 EMERGENCY DEPT VISIT MOD MDM: CPT

## 2021-06-11 PROCEDURE — 87491 CHLMYD TRACH DNA AMP PROBE: CPT

## 2021-06-11 PROCEDURE — 87661 TRICHOMONAS VAGINALIS AMPLIF: CPT

## 2021-06-11 PROCEDURE — 6370000000 HC RX 637 (ALT 250 FOR IP): Performed by: STUDENT IN AN ORGANIZED HEALTH CARE EDUCATION/TRAINING PROGRAM

## 2021-06-11 RX ORDER — DOXYCYCLINE HYCLATE 100 MG
100 TABLET ORAL 2 TIMES DAILY
Qty: 13 TABLET | Refills: 0 | Status: SHIPPED | OUTPATIENT
Start: 2021-06-11 | End: 2021-06-18

## 2021-06-11 RX ORDER — DOXYCYCLINE HYCLATE 100 MG
100 TABLET ORAL ONCE
Status: COMPLETED | OUTPATIENT
Start: 2021-06-11 | End: 2021-06-11

## 2021-06-11 RX ORDER — CEFTRIAXONE 500 MG/1
500 INJECTION, POWDER, FOR SOLUTION INTRAMUSCULAR; INTRAVENOUS ONCE
Status: COMPLETED | OUTPATIENT
Start: 2021-06-11 | End: 2021-06-11

## 2021-06-11 RX ADMIN — CEFTRIAXONE SODIUM 500 MG: 500 INJECTION, POWDER, FOR SOLUTION INTRAMUSCULAR; INTRAVENOUS at 22:42

## 2021-06-11 RX ADMIN — DOXYCYCLINE HYCLATE 100 MG: 100 TABLET ORAL at 22:43

## 2021-06-11 ASSESSMENT — PAIN SCALES - GENERAL: PAINLEVEL_OUTOF10: 7

## 2021-06-12 LAB
SOURCE: ABNORMAL
TRICHOMONAS VAGINALI, MOLECULAR: POSITIVE

## 2021-06-12 NOTE — ED PROVIDER NOTES
[address]  ED  Emergency Department Encounter  EmergencyMedicine Resident     Pt Name:Jaswant Sesay  MRN: [id number]  [place] 1985  Date of evaluation: 6/11/21  PCP:  No primary care provider on file. CHIEF COMPLAINT       Chief Complaint   Patient presents with    Urinary Tract Infection     painful urination       HISTORY OF PRESENT ILLNESS  (Location/Symptom, Timing/Onset, Context/Setting, Quality, Duration, Modifying Factors, Severity.)      Zach Petit is a 28 y.o. male who presents with chief complaint dysuria for 1 week. Denies testicular pain. Patient has unprotected vaginal intercourse with a woman, symptoms started about 24 hours afterwards. Patient is denying hematuria chest pain shortness of breath headache dizziness abdominal pain nausea vomiting fevers diarrhea. Denies allergies to antibiotics. Denies alcohol street drugs. Smokes about a pack a day    PAST MEDICAL / SURGICAL / SOCIAL / FAMILY HISTORY      has a past medical history of DI (acute kidney injury) ([address] Asthma, Cardiomyopathy ([address] Cocaine abuse ([address] Foreign body in ear, Heart attack ([address] Hypertension, Hypokalemia, and Substance abuse ([address] has no past surgical history on file.   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     Employer: [employer]   Tobacco Use    Smoking status: Current Every Day Smoker     Packs/day: 1.00     Years: 12.00     Pack years: 12.00     Types: Cigarettes    Smokeless tobacco: Never Used   Substance and Sexual Activity    Alcohol use: Yes     Comment: drinks socially;     Drug use: Yes     Types: Marijuana, Cocaine     Comment: clean from cocaine since 10/20/2018; pt still smokes marijuana     Sexual activity: Yes Other Topics Concern    Not on file   Social History Narrative    Not on file     Social Determinants of Health     Financial Resource Strain:     Difficulty of Paying Living Expenses:    Food Insecurity:     Worried About Running Out of Food in the Last Year:     [address] in the Last Year:    Transportation Needs:     Lack of Transportation (Medical):  Lack of Transportation (Non-Medical):    Physical Activity:     Days of Exercise per Week:     Minutes of Exercise per Session:    Stress:     Feeling of Stress :    Social Connections:     Frequency of Communication with Friends and Family:     Frequency of Social Gatherings with Friends and Family:     Attends Hoahaoism Services:     Active Member of Clubs or Organizations:     Attends Club or Organization Meetings:     Marital Status:    Intimate Partner Violence:     Fear of Current or Ex-Partner:     Emotionally Abused:     Physically Abused:     Sexually Abused:        Family History   Problem Relation Age of Onset    Diabetes Other     Hypertension Other     Heart Disease Other     Diabetes Mother        Allergies:  Epinephrine, Niacin and related, and Seasonal    Home Medications:  Prior to Admission medications    Medication Sig Start Date End Date Taking? Authorizing Provider   doxycycline hyclate (VIBRA-TABS) 100 MG tablet Take 1 tablet by mouth 2 times daily for 7 days 6/11/21 6/18/21 Yes Amber Rodriguez MD   albuterol sulfate HFA (PROVENTIL HFA) 108 (90 Base) MCG/ACT inhaler Inhale 1-2 puffs into the lungs every 4 hours as needed for Wheezing or Shortness of Breath Space out to every 6 hours as symptoms improve.  6/1/21   Armand Dave DO   beclomethasone (QVAR) 40 MCG/ACT inhaler Inhale 2 puffs into the lungs 2 times daily 6/1/21   Armand Dave DO   albuterol (PROVENTIL) (5 MG/ML) 0.5% nebulizer solution Take 0.5 mLs by nebulization every 4 hours as needed for Wheezing 6/1/21   Birdiejhonatan Reyes, DO   ipratropium (ATROVENT HFA) 17 MCG/ACT inhaler Inhale 2 puffs into the lungs every 6 hours 6/1/21   Henrry Bob DO   predniSONE (DELTASONE) 10 MG tablet Take 4 tablets by mouth once daily for 5 days 6/1/21 6/11/21  Kristin Reyes DO   hydrocortisone (ANUSOL-HC) 2.5 % rectal cream Place rectally 2 times daily. 1/9/19   Trevon Martin MD   aspirin 81 MG chewable tablet Take 1 tablet by mouth daily 10/30/18   Luzmaria Hollingsworth MD   verapamil (CALAN SR) 240 MG extended release tablet Take 1 tablet by mouth daily 10/30/18   Luzmaria Hollingsworth MD   lisinopril (PRINIVIL;ZESTRIL) 5 MG tablet Take 1 tablet by mouth daily 10/31/18   Luzamria Hollingsworth MD       REVIEW OF SYSTEMS    (2-9 systems for level 4, 10 or more for level 5)      Review of Systems   Constitutional: Negative for fever. HENT: Negative for congestion. Eyes: Negative for photophobia. Respiratory: Negative for shortness of breath. Cardiovascular: Negative for chest pain. Gastrointestinal: Negative for abdominal pain and vomiting. Endocrine: Negative for polyuria. Genitourinary: Negative for dysuria. Musculoskeletal: Negative for arthralgias. Skin: Negative for color change. Allergic/Immunologic: Negative for immunocompromised state. Neurological: Negative for dizziness. Hematological: Does not bruise/bleed easily. Psychiatric/Behavioral: Negative for agitation. PHYSICAL EXAM   (up to 7 for level 4, 8 or more for level 5)      INITIAL VITALS:   /78   Pulse 96   Resp 18   Ht 6' 5\" (1.956 m)   Wt 230 lb (104.3 kg)   SpO2 100%   BMI 27.27 kg/m²     Physical Exam  Constitutional:       General: Not in acute distress. Appearance: Normal appearance. Normal weight. Not toxic-appearing. HENT:      Head: Normocephalic and atraumatic. Nose: Nose normal.      Mouth/Throat: Mucous membranes are moist.  Uvula midline no oropharyngeal edema. Pharynx: Oropharynx is clear. Eyes:      Extraocular Movements: Extraocular movements intact. Conjunctiva/sclera: Conjunctivae normal.      Pupils: Pupils are equal, round, and reactive to light. Neck:      Musculoskeletal: Normal range of motion and neck supple. No neck rigidity. Cardiovascular:      Rate and Rhythm: Normal rate and regular rhythm. Pulses: Normal pulses. Heart sounds: Normal heart sounds. No murmur. Pulmonary:      Effort: Pulmonary effort is normal.      Breath sounds: Normal breath sounds. No wheezing. Abdominal:      General: Abdomen is flat. Bowel sounds are normal.      Tenderness: There is no abdominal tenderness. Musculoskeletal:     Normal range of motion. General: No swelling or tenderness. No LE edema    Skin:     General: Skin is warm. Capillary Refill: Capillary refill takes less than 2 seconds. Coloration: Skin is not jaundiced. Neurological:      General: No focal deficit present. Mental Status: Alert and oriented to person, place, and time. Mental status is at baseline. Motor: No weakness.      Urogenital: Unremarkable testicles, nontender, normal cremaster, no discharge      DIFFERENTIAL  DIAGNOSIS     PLAN (LABS / IMAGING / EKG):  Orders Placed This Encounter   Procedures    Trichomonas Vaginali, Molecular    C.trachomatis N.gonorrhoeae DNA, Urine    URINALYSIS WITH MICROSCOPIC       MEDICATIONS ORDERED:  Orders Placed This Encounter   Medications    cefTRIAXone (ROCEPHIN) injection 500 mg     Order Specific Question:   Antimicrobial Indications     Answer:   STD infection    doxycycline hyclate (VIBRA-TABS) tablet 100 mg     Order Specific Question:   Antimicrobial Indications     Answer:   STD infection    doxycycline hyclate (VIBRA-TABS) 100 MG tablet     Sig: Take 1 tablet by mouth 2 times daily for 7 days     Dispense:  13 tablet     Refill:  0           DIAGNOSTIC RESULTS / EMERGENCY DEPARTMENT COURSE / MDM     LABS:  Results for orders placed or performed during the hospital encounter of 06/11/21 URINALYSIS WITH MICROSCOPIC   Result Value Ref Range    Color, UA YELLOW YELLOW    Turbidity UA CLEAR CLEAR    Glucose, Ur NEGATIVE NEGATIVE    Bilirubin Urine NEGATIVE NEGATIVE    Ketones, Urine NEGATIVE NEGATIVE    Specific Gravity, UA 1.014 1.005 - 1.030    Urine Hgb TRACE (A) NEGATIVE    pH, UA 6.0 5.0 - 8.0    Protein, UA NEGATIVE NEGATIVE    Urobilinogen, Urine Normal Normal    Nitrite, Urine NEGATIVE NEGATIVE    Leukocyte Esterase, Urine NEGATIVE NEGATIVE    -          WBC, UA 2 TO 5 0 - 5 /HPF    RBC, UA 2 TO 5 0 - 4 /HPF    Casts UA NOT REPORTED 0 - 8 /LPF    Crystals, UA NOT REPORTED None /HPF    Epithelial Cells UA 0 TO 2 0 - 5 /HPF    Renal Epithelial, UA NOT REPORTED 0 /HPF    Bacteria, UA NOT REPORTED None    Mucus, UA NOT REPORTED None    Trichomonas, UA NOT REPORTED None    Amorphous, UA NOT REPORTED None    Other Observations UA NOT REPORTED NOT REQ. Yeast, UA NOT REPORTED None         RADIOLOGY:  None    EKG  None    All EKG's are interpreted by the Emergency Department Physician who either signs or Co-signs this chart in the absence of a cardiologist.    EMERGENCY DEPARTMENT COURSE:  Patient breathing quietly and unlabored on room air. Speech is normal and speaking in full sentences without requiring to pause to take a breath. Vital signs stable afebrile physical exam unremarkable. Will get urinalysis with no acute process for STIs and trichomonas.   Highly suspicious given presentation, will plan to treat empirically    PROCEDURES:  None    CONSULTS:  None    CRITICAL CARE:  None    FINAL IMPRESSION      1. STI (sexually transmitted infection)          DISPOSITION / [address] Discharge - Pending Orders Complete 06/11/2021 10:30:38 PM      PATIENT REFERRED TO:  [address] [address] [phone]  Schedule an appointment as soon as possible for a visit in 3 days        DISCHARGE MEDICATIONS:  New Prescriptions DOXYCYCLINE HYCLATE (VIBRA-TABS) 100 MG TABLET    Take 1 tablet by mouth 2 times daily for 7 days       Damita Runner, MD  Emergency Medicine Resident    (Please note that portions of thisnote were completed with a voice recognition program.  Efforts were made to edit the dictations but occasionally words are mis-transcribed.)       Damita Runner, MD  Resident  06/11/21 [id number]

## 2021-06-12 NOTE — PROGRESS NOTES
I signed up for this patient in error. I did not see this patient. I did not participate in the evlauation or treatment of this patient.     Electronically signed by Benito Carrasquillo DO on 6/11/2021 at 9:41 PM

## 2021-06-12 NOTE — ED PROVIDER NOTES
[facility]     Emergency Department     Faculty Attestation    I performed a history and physical examination of the patient and discussed management with the resident. I reviewed the resident´s note and agree with the documented findings and plan of care. Any areas of disagreement are noted on the chart. I was personally present for the key portions of any procedures. I have documented in the chart those procedures where I was not present during the key portions. I have reviewed the emergency nurses triage note. I agree with the chief complaint, past medical history, past surgical history, allergies, medications, social and family history as documented unless otherwise noted below. For Physician Assistant/ Nurse Practitioner cases/documentation I have personally evaluated this patient and have completed at least one if not all key elements of the E/M (history, physical exam, and MDM). Additional findings are as noted. Abdomen soft and nontender, no CVA tenderness.      Miriam Beach MD  06/11/21 [id number]

## 2021-06-14 LAB
C. TRACHOMATIS DNA ,URINE: NEGATIVE
N. GONORRHOEAE DNA, URINE: NEGATIVE
SPECIMEN DESCRIPTION: NORMAL

## 2023-07-14 ENCOUNTER — HOSPITAL ENCOUNTER (EMERGENCY)
Age: 38
Discharge: ANOTHER HOSPITAL | End: 2023-07-14
Attending: EMERGENCY MEDICINE
Payer: MEDICAID

## 2023-07-14 ENCOUNTER — HOSPITAL ENCOUNTER (INPATIENT)
Age: 38
LOS: 6 days | Discharge: LEFT BEFORE DISCHARGE | End: 2023-07-20
Attending: STUDENT IN AN ORGANIZED HEALTH CARE EDUCATION/TRAINING PROGRAM
Payer: MEDICAID

## 2023-07-14 ENCOUNTER — APPOINTMENT (OUTPATIENT)
Dept: GENERAL RADIOLOGY | Age: 38
End: 2023-07-14
Payer: MEDICAID

## 2023-07-14 VITALS
TEMPERATURE: 97.8 F | SYSTOLIC BLOOD PRESSURE: 113 MMHG | HEIGHT: 77 IN | OXYGEN SATURATION: 99 % | BODY MASS INDEX: 27.16 KG/M2 | WEIGHT: 230 LBS | HEART RATE: 90 BPM | RESPIRATION RATE: 19 BRPM | DIASTOLIC BLOOD PRESSURE: 74 MMHG

## 2023-07-14 DIAGNOSIS — I47.20 VENTRICULAR TACHYARRHYTHMIA (HCC): ICD-10-CM

## 2023-07-14 DIAGNOSIS — R07.9 CHEST PAIN, UNSPECIFIED TYPE: ICD-10-CM

## 2023-07-14 DIAGNOSIS — I47.20 VENTRICULAR TACHYCARDIA (HCC): Primary | ICD-10-CM

## 2023-07-14 LAB
ALBUMIN SERPL-MCNC: 3.7 G/DL (ref 3.5–5.2)
ALP SERPL-CCNC: 86 U/L (ref 40–129)
ALT SERPL-CCNC: 49 U/L (ref 5–41)
ANION GAP SERPL CALCULATED.3IONS-SCNC: 9 MMOL/L (ref 9–17)
AST SERPL-CCNC: 84 U/L
BASOPHILS # BLD: 0 K/UL (ref 0–0.2)
BASOPHILS NFR BLD: 1 % (ref 0–2)
BILIRUB SERPL-MCNC: <0.2 MG/DL (ref 0.3–1.2)
BNP SERPL-MCNC: 617 PG/ML
BUN SERPL-MCNC: 5 MG/DL (ref 6–20)
CALCIUM SERPL-MCNC: 8.3 MG/DL (ref 8.6–10.4)
CHLORIDE SERPL-SCNC: 107 MMOL/L (ref 98–107)
CO2 SERPL-SCNC: 22 MMOL/L (ref 20–31)
CREAT SERPL-MCNC: 1.2 MG/DL (ref 0.7–1.2)
EKG ATRIAL RATE: 91 BPM
EKG P AXIS: 65 DEGREES
EKG P-R INTERVAL: 160 MS
EKG Q-T INTERVAL: 376 MS
EKG QRS DURATION: 102 MS
EKG QTC CALCULATION (BAZETT): 462 MS
EKG R AXIS: 31 DEGREES
EKG T AXIS: 90 DEGREES
EKG VENTRICULAR RATE: 91 BPM
EOSINOPHIL # BLD: 0.2 K/UL (ref 0–0.4)
EOSINOPHILS RELATIVE PERCENT: 5 % (ref 0–4)
ERYTHROCYTE [DISTWIDTH] IN BLOOD BY AUTOMATED COUNT: 13.2 % (ref 11.5–14.9)
GFR SERPL CREATININE-BSD FRML MDRD: >60 ML/MIN/1.73M2
GLUCOSE SERPL-MCNC: 159 MG/DL (ref 70–99)
HCT VFR BLD AUTO: 44.3 % (ref 41–53)
HGB BLD-MCNC: 14.6 G/DL (ref 13.5–17.5)
LYMPHOCYTES # BLD: 36 % (ref 24–44)
LYMPHOCYTES NFR BLD: 1.7 K/UL (ref 1–4.8)
MCH RBC QN AUTO: 30.3 PG (ref 26–34)
MCHC RBC AUTO-ENTMCNC: 32.9 G/DL (ref 31–37)
MCV RBC AUTO: 92.2 FL (ref 80–100)
MONOCYTES NFR BLD: 0.4 K/UL (ref 0.1–1.3)
MONOCYTES NFR BLD: 10 % (ref 1–7)
NEUTROPHILS NFR BLD: 48 % (ref 36–66)
NEUTS SEG NFR BLD: 2.3 K/UL (ref 1.3–9.1)
PLATELET # BLD AUTO: 178 K/UL (ref 150–450)
PMV BLD AUTO: 10.7 FL (ref 6–12)
POTASSIUM SERPL-SCNC: 4 MMOL/L (ref 3.7–5.3)
PROT SERPL-MCNC: 6.2 G/DL (ref 6.4–8.3)
RBC # BLD AUTO: 4.81 M/UL (ref 4.5–5.9)
SODIUM SERPL-SCNC: 138 MMOL/L (ref 135–144)
TROPONIN I SERPL HS-MCNC: 28 NG/L (ref 0–22)
TROPONIN I SERPL HS-MCNC: 28 NG/L (ref 0–22)
TROPONIN I SERPL HS-MCNC: 32 NG/L (ref 0–22)
WBC OTHER # BLD: 4.7 K/UL (ref 3.5–11)

## 2023-07-14 PROCEDURE — 96365 THER/PROPH/DIAG IV INF INIT: CPT

## 2023-07-14 PROCEDURE — 99285 EMERGENCY DEPT VISIT HI MDM: CPT

## 2023-07-14 PROCEDURE — 80053 COMPREHEN METABOLIC PANEL: CPT

## 2023-07-14 PROCEDURE — 36415 COLL VENOUS BLD VENIPUNCTURE: CPT

## 2023-07-14 PROCEDURE — 84484 ASSAY OF TROPONIN QUANT: CPT

## 2023-07-14 PROCEDURE — 93005 ELECTROCARDIOGRAM TRACING: CPT | Performed by: EMERGENCY MEDICINE

## 2023-07-14 PROCEDURE — 2500000003 HC RX 250 WO HCPCS: Performed by: EMERGENCY MEDICINE

## 2023-07-14 PROCEDURE — 85027 COMPLETE CBC AUTOMATED: CPT

## 2023-07-14 PROCEDURE — 96375 TX/PRO/DX INJ NEW DRUG ADDON: CPT

## 2023-07-14 PROCEDURE — 96367 TX/PROPH/DG ADDL SEQ IV INF: CPT

## 2023-07-14 PROCEDURE — 93010 ELECTROCARDIOGRAM REPORT: CPT | Performed by: INTERNAL MEDICINE

## 2023-07-14 PROCEDURE — 71046 X-RAY EXAM CHEST 2 VIEWS: CPT

## 2023-07-14 PROCEDURE — 2580000003 HC RX 258: Performed by: EMERGENCY MEDICINE

## 2023-07-14 PROCEDURE — 2060000000 HC ICU INTERMEDIATE R&B

## 2023-07-14 PROCEDURE — 6360000002 HC RX W HCPCS: Performed by: EMERGENCY MEDICINE

## 2023-07-14 PROCEDURE — 83880 ASSAY OF NATRIURETIC PEPTIDE: CPT

## 2023-07-14 PROCEDURE — 96366 THER/PROPH/DIAG IV INF ADDON: CPT

## 2023-07-14 RX ORDER — ONDANSETRON 4 MG/1
4 TABLET, ORALLY DISINTEGRATING ORAL EVERY 8 HOURS PRN
Status: DISCONTINUED | OUTPATIENT
Start: 2023-07-14 | End: 2023-07-20 | Stop reason: HOSPADM

## 2023-07-14 RX ORDER — ACETAMINOPHEN 650 MG/1
650 SUPPOSITORY RECTAL EVERY 6 HOURS PRN
Status: DISCONTINUED | OUTPATIENT
Start: 2023-07-14 | End: 2023-07-20 | Stop reason: HOSPADM

## 2023-07-14 RX ORDER — ASPIRIN 81 MG/1
81 TABLET, CHEWABLE ORAL DAILY
Status: DISCONTINUED | OUTPATIENT
Start: 2023-07-15 | End: 2023-07-20 | Stop reason: HOSPADM

## 2023-07-14 RX ORDER — MAGNESIUM SULFATE HEPTAHYDRATE 40 MG/ML
2000 INJECTION, SOLUTION INTRAVENOUS ONCE
Status: COMPLETED | OUTPATIENT
Start: 2023-07-14 | End: 2023-07-14

## 2023-07-14 RX ORDER — ONDANSETRON 2 MG/ML
4 INJECTION INTRAMUSCULAR; INTRAVENOUS EVERY 6 HOURS PRN
Status: DISCONTINUED | OUTPATIENT
Start: 2023-07-14 | End: 2023-07-20 | Stop reason: HOSPADM

## 2023-07-14 RX ORDER — SODIUM CHLORIDE 9 MG/ML
INJECTION, SOLUTION INTRAVENOUS PRN
Status: DISCONTINUED | OUTPATIENT
Start: 2023-07-14 | End: 2023-07-20 | Stop reason: HOSPADM

## 2023-07-14 RX ORDER — POTASSIUM CHLORIDE 20 MEQ/1
40 TABLET, EXTENDED RELEASE ORAL PRN
Status: DISCONTINUED | OUTPATIENT
Start: 2023-07-14 | End: 2023-07-20 | Stop reason: HOSPADM

## 2023-07-14 RX ORDER — LORAZEPAM 2 MG/ML
1 INJECTION INTRAMUSCULAR ONCE
Status: COMPLETED | OUTPATIENT
Start: 2023-07-14 | End: 2023-07-14

## 2023-07-14 RX ORDER — 0.9 % SODIUM CHLORIDE 0.9 %
1000 INTRAVENOUS SOLUTION INTRAVENOUS ONCE
Status: DISCONTINUED | OUTPATIENT
Start: 2023-07-14 | End: 2023-07-14

## 2023-07-14 RX ORDER — ACETAMINOPHEN 325 MG/1
650 TABLET ORAL EVERY 6 HOURS PRN
Status: DISCONTINUED | OUTPATIENT
Start: 2023-07-14 | End: 2023-07-20 | Stop reason: HOSPADM

## 2023-07-14 RX ORDER — ENOXAPARIN SODIUM 150 MG/ML
1 INJECTION SUBCUTANEOUS 2 TIMES DAILY
Status: DISCONTINUED | OUTPATIENT
Start: 2023-07-14 | End: 2023-07-20 | Stop reason: HOSPADM

## 2023-07-14 RX ORDER — MAGNESIUM SULFATE 1 G/100ML
1000 INJECTION INTRAVENOUS PRN
Status: DISCONTINUED | OUTPATIENT
Start: 2023-07-14 | End: 2023-07-20 | Stop reason: HOSPADM

## 2023-07-14 RX ORDER — SODIUM CHLORIDE 9 MG/ML
INJECTION, SOLUTION INTRAVENOUS CONTINUOUS
Status: DISCONTINUED | OUTPATIENT
Start: 2023-07-14 | End: 2023-07-15

## 2023-07-14 RX ORDER — ATORVASTATIN CALCIUM 80 MG/1
80 TABLET, FILM COATED ORAL NIGHTLY
Status: DISCONTINUED | OUTPATIENT
Start: 2023-07-14 | End: 2023-07-15

## 2023-07-14 RX ORDER — 0.9 % SODIUM CHLORIDE 0.9 %
500 INTRAVENOUS SOLUTION INTRAVENOUS ONCE
Status: COMPLETED | OUTPATIENT
Start: 2023-07-14 | End: 2023-07-14

## 2023-07-14 RX ORDER — SODIUM CHLORIDE 0.9 % (FLUSH) 0.9 %
10 SYRINGE (ML) INJECTION PRN
Status: DISCONTINUED | OUTPATIENT
Start: 2023-07-14 | End: 2023-07-20 | Stop reason: HOSPADM

## 2023-07-14 RX ORDER — NITROGLYCERIN 0.4 MG/1
0.4 TABLET SUBLINGUAL EVERY 5 MIN PRN
Status: DISCONTINUED | OUTPATIENT
Start: 2023-07-14 | End: 2023-07-20 | Stop reason: HOSPADM

## 2023-07-14 RX ORDER — POTASSIUM CHLORIDE 7.45 MG/ML
10 INJECTION INTRAVENOUS PRN
Status: DISCONTINUED | OUTPATIENT
Start: 2023-07-14 | End: 2023-07-20 | Stop reason: HOSPADM

## 2023-07-14 RX ORDER — SODIUM CHLORIDE 0.9 % (FLUSH) 0.9 %
5-40 SYRINGE (ML) INJECTION EVERY 12 HOURS SCHEDULED
Status: DISCONTINUED | OUTPATIENT
Start: 2023-07-14 | End: 2023-07-20 | Stop reason: HOSPADM

## 2023-07-14 RX ADMIN — LORAZEPAM 1 MG: 2 INJECTION INTRAMUSCULAR; INTRAVENOUS at 16:39

## 2023-07-14 RX ADMIN — AMIODARONE HYDROCHLORIDE 150 MG: 1.5 INJECTION, SOLUTION INTRAVENOUS at 19:52

## 2023-07-14 RX ADMIN — MAGNESIUM SULFATE HEPTAHYDRATE 2000 MG: 40 INJECTION, SOLUTION INTRAVENOUS at 16:41

## 2023-07-14 RX ADMIN — SODIUM CHLORIDE 500 ML: 0.9 INJECTION, SOLUTION INTRAVENOUS at 16:59

## 2023-07-14 RX ADMIN — AMIODARONE HYDROCHLORIDE 1 MG/MIN: 1.8 INJECTION, SOLUTION INTRAVENOUS at 20:07

## 2023-07-14 ASSESSMENT — ENCOUNTER SYMPTOMS
ABDOMINAL PAIN: 0
SHORTNESS OF BREATH: 1
DIARRHEA: 0
VOMITING: 0
NAUSEA: 1

## 2023-07-14 ASSESSMENT — PAIN - FUNCTIONAL ASSESSMENT: PAIN_FUNCTIONAL_ASSESSMENT: NONE - DENIES PAIN

## 2023-07-14 ASSESSMENT — LIFESTYLE VARIABLES
HOW MANY STANDARD DRINKS CONTAINING ALCOHOL DO YOU HAVE ON A TYPICAL DAY: PATIENT DOES NOT DRINK
HOW OFTEN DO YOU HAVE A DRINK CONTAINING ALCOHOL: NEVER

## 2023-07-14 ASSESSMENT — PAIN SCALES - GENERAL: PAINLEVEL_OUTOF10: 0

## 2023-07-14 NOTE — ED PROVIDER NOTES
[facility] ED  Emergency Department Encounter  Emergency Medicine Resident     Pt Name:Jaswant Torres  MRN: [id number]  [address] [facility] 1985  Date of evaluation: 7/14/23  PCP:  No primary care provider on file. Note Started: 4:39 PM EDT      CHIEF COMPLAINT       Chief Complaint   Patient presents with    Chest Pain       HISTORY OF PRESENT ILLNESS  (Location/Symptom, Timing/Onset, Context/Setting, Quality, Duration, Modifying Factors, Severity.)      Sharon Hein is a 40 y.o. male who presents with chest pain and shortness of breath that started at rest at home. Patient states that he was eating chicken tenders and drinking a tea when he started feeling sudden onset chest pain with shortness of breath, associated diaphoresis and lightheadedness. He states he has had chest pain in the past but never like this before. He called 911 and per EMS report he was in V. tach with a rate of 270. Given 6 mg of adenosine with resolution. Patient states now he feels better, no chest pain or shortness of breath. He denies nausea, diaphoresis, lightheadedness, dizziness. He states that he did use cocaine about a week ago and has been told he should not use cocaine and given his history of hypertrophic cardiomyopathy. He also states he smokes marijuana earlier today. Denies alcohol. PAST MEDICAL / SURGICAL / SOCIAL / FAMILY HISTORY      has a past medical history of DI (acute kidney injury) ([address]), Asthma, Cardiomyopathy ([address]), Cocaine abuse ([address]), Foreign body in ear, Heart attack ([address]), Hypertension, Hypokalemia, and Substance abuse ([address] has no past surgical history on file.       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

## 2023-07-14 NOTE — ED PROVIDER NOTES
[address]     Pt Name: Savanna Conrad  MRN: [id number]  [address] [facility] 1985  Date of evaluation: 7/14/23       Savanna Conrad is a 40 y.o. male who presents with Chest Pain  PM[facility] of [facility]. Called EMS with CP, SOB. They found him in a wide complex tachycardia with a HR of 270. They gave 6 mg adenosine with conversion to NSR and resolution of chest pain. Cocaine use several days ago. MDM:   On arrival hemodynamically stable. No chest pain. EKG NSR, no acute ST segment elevation. Cardiology recommending amiodarone and transfer to [facility]. Vitals:   Vitals:    07/14/23 2030 07/14/23 2100 07/14/23 2130 07/14/23 2139   BP:       Pulse: 66 81 78 90   Resp: 13 11 16 19   Temp:       SpO2:       Weight:       Height:             I personally saw and examined the patient. I have reviewed and agree with the resident's findings, including all diagnostic interpretations and treatment plan as written. I was present for the key portions of any procedures performed and the inclusive time noted for any critical care statement.     Sarah Weiner MD  Attending Emergency Physician                        Nathanel Carrel, MD  07/14/23 5039

## 2023-07-15 LAB
ANION GAP SERPL CALCULATED.3IONS-SCNC: 9 MMOL/L (ref 9–17)
BUN SERPL-MCNC: 5 MG/DL (ref 6–20)
CALCIUM SERPL-MCNC: 8.6 MG/DL (ref 8.6–10.4)
CHLORIDE SERPL-SCNC: 108 MMOL/L (ref 98–107)
CHOLEST SERPL-MCNC: 99 MG/DL
CHOLESTEROL/HDL RATIO: 2.9
CO2 SERPL-SCNC: 21 MMOL/L (ref 20–31)
CREAT SERPL-MCNC: 1.1 MG/DL (ref 0.7–1.2)
EKG ATRIAL RATE: 72 BPM
EKG P AXIS: 61 DEGREES
EKG P-R INTERVAL: 166 MS
EKG Q-T INTERVAL: 464 MS
EKG QRS DURATION: 100 MS
EKG QTC CALCULATION (BAZETT): 508 MS
EKG R AXIS: 40 DEGREES
EKG T AXIS: 73 DEGREES
EKG VENTRICULAR RATE: 72 BPM
ERYTHROCYTE [DISTWIDTH] IN BLOOD BY AUTOMATED COUNT: 13.2 % (ref 11.8–14.4)
GFR SERPL CREATININE-BSD FRML MDRD: >60 ML/MIN/1.73M2
GLUCOSE SERPL-MCNC: 126 MG/DL (ref 70–99)
HCT VFR BLD AUTO: 43.7 % (ref 40.7–50.3)
HDLC SERPL-MCNC: 34 MG/DL
HGB BLD-MCNC: 13.4 G/DL (ref 13–17)
LACTIC ACID, WHOLE BLOOD: 2.3 MMOL/L (ref 0.7–2.1)
LDLC SERPL CALC-MCNC: 49 MG/DL (ref 0–130)
MAGNESIUM SERPL-MCNC: 2.3 MG/DL (ref 1.6–2.6)
MAGNESIUM SERPL-MCNC: 2.4 MG/DL (ref 1.6–2.6)
MCH RBC QN AUTO: 29.8 PG (ref 25.2–33.5)
MCHC RBC AUTO-ENTMCNC: 30.7 G/DL (ref 28.4–34.8)
MCV RBC AUTO: 97.3 FL (ref 82.6–102.9)
NRBC BLD-RTO: 0 PER 100 WBC
PLATELET # BLD AUTO: 168 K/UL (ref 138–453)
PMV BLD AUTO: 11.9 FL (ref 8.1–13.5)
POTASSIUM SERPL-SCNC: 4.3 MMOL/L (ref 3.7–5.3)
RBC # BLD AUTO: 4.49 M/UL (ref 4.21–5.77)
SODIUM SERPL-SCNC: 138 MMOL/L (ref 135–144)
TRIGL SERPL-MCNC: 78 MG/DL
TROPONIN I SERPL HS-MCNC: 30 NG/L (ref 0–22)
TSH SERPL DL<=0.05 MIU/L-ACNC: 0.9 UIU/ML (ref 0.3–5)
WBC OTHER # BLD: 4.9 K/UL (ref 3.5–11.3)

## 2023-07-15 PROCEDURE — 2580000003 HC RX 258: Performed by: NURSE PRACTITIONER

## 2023-07-15 PROCEDURE — 6370000000 HC RX 637 (ALT 250 FOR IP): Performed by: NURSE PRACTITIONER

## 2023-07-15 PROCEDURE — 6360000002 HC RX W HCPCS: Performed by: NURSE PRACTITIONER

## 2023-07-15 PROCEDURE — 84443 ASSAY THYROID STIM HORMONE: CPT

## 2023-07-15 PROCEDURE — 6370000000 HC RX 637 (ALT 250 FOR IP): Performed by: HOSPITALIST

## 2023-07-15 PROCEDURE — 99222 1ST HOSP IP/OBS MODERATE 55: CPT | Performed by: INTERNAL MEDICINE

## 2023-07-15 PROCEDURE — 99254 IP/OBS CNSLTJ NEW/EST MOD 60: CPT | Performed by: INTERNAL MEDICINE

## 2023-07-15 PROCEDURE — 80061 LIPID PANEL: CPT

## 2023-07-15 PROCEDURE — 85027 COMPLETE CBC AUTOMATED: CPT

## 2023-07-15 PROCEDURE — 94760 N-INVAS EAR/PLS OXIMETRY 1: CPT

## 2023-07-15 PROCEDURE — 36415 COLL VENOUS BLD VENIPUNCTURE: CPT

## 2023-07-15 PROCEDURE — 80048 BASIC METABOLIC PNL TOTAL CA: CPT

## 2023-07-15 PROCEDURE — 83735 ASSAY OF MAGNESIUM: CPT

## 2023-07-15 PROCEDURE — 84484 ASSAY OF TROPONIN QUANT: CPT

## 2023-07-15 PROCEDURE — 93005 ELECTROCARDIOGRAM TRACING: CPT | Performed by: NURSE PRACTITIONER

## 2023-07-15 PROCEDURE — 83605 ASSAY OF LACTIC ACID: CPT

## 2023-07-15 PROCEDURE — 2060000000 HC ICU INTERMEDIATE R&B

## 2023-07-15 PROCEDURE — 6360000002 HC RX W HCPCS

## 2023-07-15 RX ORDER — FLUTICASONE PROPIONATE 110 UG/1
2 AEROSOL, METERED RESPIRATORY (INHALATION)
Status: DISCONTINUED | OUTPATIENT
Start: 2023-07-15 | End: 2023-07-20 | Stop reason: HOSPADM

## 2023-07-15 RX ORDER — GUAIFENESIN/DEXTROMETHORPHAN 100-10MG/5
5 SYRUP ORAL EVERY 4 HOURS PRN
Status: DISCONTINUED | OUTPATIENT
Start: 2023-07-15 | End: 2023-07-20 | Stop reason: HOSPADM

## 2023-07-15 RX ORDER — LISINOPRIL 5 MG/1
5 TABLET ORAL DAILY
Status: DISCONTINUED | OUTPATIENT
Start: 2023-07-15 | End: 2023-07-20 | Stop reason: HOSPADM

## 2023-07-15 RX ORDER — LIDOCAINE HYDROCHLORIDE ANHYDROUS AND DEXTROSE MONOHYDRATE 5; 400 G/100ML; MG/100ML
0.5 INJECTION, SOLUTION INTRAVENOUS CONTINUOUS
Status: DISCONTINUED | OUTPATIENT
Start: 2023-07-15 | End: 2023-07-18

## 2023-07-15 RX ORDER — VERAPAMIL HYDROCHLORIDE 240 MG/1
240 TABLET, FILM COATED, EXTENDED RELEASE ORAL DAILY
Status: DISCONTINUED | OUTPATIENT
Start: 2023-07-15 | End: 2023-07-20 | Stop reason: HOSPADM

## 2023-07-15 RX ADMIN — SODIUM CHLORIDE, PRESERVATIVE FREE 10 ML: 5 INJECTION INTRAVENOUS at 07:39

## 2023-07-15 RX ADMIN — SODIUM CHLORIDE: 9 INJECTION, SOLUTION INTRAVENOUS at 00:37

## 2023-07-15 RX ADMIN — VERAPAMIL HYDROCHLORIDE 240 MG: 240 TABLET, FILM COATED, EXTENDED RELEASE ORAL at 17:44

## 2023-07-15 RX ADMIN — GUAIFENESIN AND DEXTROMETHORPHAN 5 ML: 100; 10 SYRUP ORAL at 16:12

## 2023-07-15 RX ADMIN — METOPROLOL TARTRATE 25 MG: 25 TABLET, FILM COATED ORAL at 00:25

## 2023-07-15 RX ADMIN — AMIODARONE HYDROCHLORIDE 1 MG/MIN: 50 INJECTION, SOLUTION INTRAVENOUS at 00:26

## 2023-07-15 RX ADMIN — ATORVASTATIN CALCIUM 80 MG: 80 TABLET, FILM COATED ORAL at 00:24

## 2023-07-15 RX ADMIN — SODIUM CHLORIDE: 9 INJECTION, SOLUTION INTRAVENOUS at 11:37

## 2023-07-15 RX ADMIN — METOPROLOL TARTRATE 25 MG: 25 TABLET, FILM COATED ORAL at 20:01

## 2023-07-15 RX ADMIN — ASPIRIN 81 MG 81 MG: 81 TABLET ORAL at 07:38

## 2023-07-15 RX ADMIN — LIDOCAINE HYDROCHLORIDE 1 MG/MIN: 4 INJECTION, SOLUTION INTRAVENOUS at 13:29

## 2023-07-15 RX ADMIN — METOPROLOL TARTRATE 25 MG: 25 TABLET, FILM COATED ORAL at 07:38

## 2023-07-15 ASSESSMENT — PAIN SCALES - GENERAL
PAINLEVEL_OUTOF10: 0

## 2023-07-15 NOTE — ED NOTES
Pts mother at bedside updated on plan to transfer to [address] [place] [address] Drive, RN  07/14/23 7232

## 2023-07-15 NOTE — CARE COORDINATION
Met with pt after receiving a social work consult for substance abuse. Pt is alert and oriented and had his mom in his room. Pt states that he does not want any type of treatment for substance abuse. He states that he has used in the past when he wasn't concerned about his health. He states that he is more interested in making sure he can get to his doctor appt's and get prescriptions filled. Pt has [place] Medicaid. Explained to pt that he will need to get his medicaid changed to [place] if he is planning to reside here. He states that he has been here for over a year. Pt requested help with rides to his doctor appts. Explained that his [place] medicaid [place] not cover for rides to appts in [place] and most doctor's in [place] cannot accept [place] medicaid. Informed him that our HELP program will work with him on getting his Medicaid changed but he would need to cancel his [place] medicaid. Explained to pt that he would be responsible for cab fare or bus fare until his medicaid is switched to [place].

## 2023-07-15 NOTE — H&P
[facility]  Office: [address], DO, Brittani Newmane, DO, Medford Me, DO, Oralee Jose Blood, DO, Isatu Edward MD, Reagan Emerson MD, Danii Bob MD, Harpal Maravilla MD,  Carlota Loo MD, Jody Byers MD, Taty Javed, DO, Danika Padilla MD,  Jose Ordaz MD, Mary Singletary MD, Ciaran Woodard DO, Gladys Turner MD,  Bright Berman DO, Casey Davila MD, Luisa Le MD, Danielle Mckeon MD, Olga Mancuso MD,  Jesenia Moreno MD, Rj Roldan MD, Parminder Lara DO, Leo Bliss MD,  Nneka Blank MD, Rani Yuen, CNP,  Khalif Ferguson, CNP, Patricia Ferrell, CNP, Jeff Noonan, CNP,  Avtar Jerry, [facility], Chase Valdivia, CNP, Mitra Mclain, CNP, Teresita Fatima, CNP, Almaz Morin, CNP, Oscar Waters, CNP, Bairon Alexander PAEliseC, Bairon Mccollum, CNS, Dora Malone, CNP, Nasim Franklin, [facility]         [facility]    HISTORY AND PHYSICAL EXAMINATION            Date:   7/15/2023  Patient name:  Zach Pace  Date of admission:  7/14/2023 10:13 PM  MRN:   [id number]  Account:  [de-identified]  YOB: 1985  PCP:    No primary care provider on file. Room:   2012/2012-01  Code Status:    Full Code    Chief Complaint:     'felt like I was going to die\"    History Obtained From:     patient    History of Present Illness:     Zach Pace is a 40 y.o. male with HOCM, asthma, substance abuse/cocaine who presents with V. tach symptomatic with SVT, heart rate 270 by EMS report status post adenosine. Initially evaluated [facility] and recommended transfer to [facility] for the management of V tach [facility]). Patient does have longstanding history of hokum status post prior cardiac work-up, patient admits he has been lost to follow-up in the past few years and has not had any recent imaging.   He did describe abrupt onset of acute severe shortness of breath with

## 2023-07-15 NOTE — PLAN OF CARE
Problem: Discharge Planning  Goal: Discharge to home or other facility with appropriate resources  Recent Flowsheet Documentation  Taken 7/15/2023 0800 by Rey Adan RN  Discharge to home or other facility with appropriate resources: Identify barriers to discharge with patient and caregiver

## 2023-07-15 NOTE — CARE COORDINATION
Case Management Assessment  Initial Evaluation    Date/Time of Evaluation: 7/15/2023 11:58 AM  Assessment Completed by: Germania Sanchez RN    If patient is discharged prior to next notation, then this note serves as note for discharge by case management. Patient Name: Marie Reeves                   YOB: 1985  Diagnosis: V tach [facility]) [I47.20]  Ventricular tachycardia ([address]) [I47.20]                   Date / Time: 7/14/2023 10:13 PM    Patient Admission Status: Inpatient   Readmission Risk (Low < 19, Mod (19-27), High > 27): Readmission Risk Score: 7.6    Current PCP: No primary care provider on file. PCP verified by CM? (P) No (Pt does not have a PCP.)    Chart Reviewed: Yes      History Provided by: (P) Patient  Patient Orientation: (P) Alert and Oriented    Patient Cognition: (P) Alert    Hospitalization in the last 30 days (Readmission):  No    If yes, Readmission Assessment in CM Navigator will be completed. Advance Directives:      Code Status: Full Code   Patient's Primary Decision Maker is: (P) Legal Next of Kin      Discharge Planning:    Patient lives with: (P) Alone Type of Home: (P) House  Primary Care Giver: (P) Self  Patient Support Systems include: (P) Spouse/Significant Other, Friends/Neighbors   Current Financial resources: (P) Medicaid  Current community resources: (P) None  Current services prior to admission: (P) None            Current DME:              Type of Home Care services:       ADLS  Prior functional level: (P) Independent in ADLs/IADLs  Current functional level: (P) Independent in ADLs/IADLs    PT AM-PAC:   /24  OT AM-PAC:   /24    Family can provide assistance at DC: (P) Yes  Would you like Case Management to discuss the discharge plan with any other family members/significant others, and if so, who? (P) No  Plans to Return to Present Housing: (P) Yes  Other Identified Issues/Barriers to RETURNING to current housing: None identified.   Potential Assistance

## 2023-07-16 PROBLEM — R94.31 PROLONGED Q-T INTERVAL ON ECG: Status: ACTIVE | Noted: 2023-07-16

## 2023-07-16 LAB
ALBUMIN SERPL-MCNC: 3.5 G/DL (ref 3.5–5.2)
ALBUMIN/GLOB SERPL: 1.5 {RATIO} (ref 1–2.5)
ALP SERPL-CCNC: 73 U/L (ref 40–129)
ALT SERPL-CCNC: 48 U/L (ref 5–41)
ANION GAP SERPL CALCULATED.3IONS-SCNC: 10 MMOL/L (ref 9–17)
AST SERPL-CCNC: 24 U/L
BASOPHILS # BLD: 0.05 K/UL (ref 0–0.2)
BASOPHILS NFR BLD: 1 % (ref 0–2)
BILIRUB DIRECT SERPL-MCNC: <0.1 MG/DL
BILIRUB INDIRECT SERPL-MCNC: ABNORMAL MG/DL (ref 0–1)
BILIRUB SERPL-MCNC: <0.1 MG/DL (ref 0.3–1.2)
BUN SERPL-MCNC: 5 MG/DL (ref 6–20)
CALCIUM SERPL-MCNC: 8.7 MG/DL (ref 8.6–10.4)
CHLORIDE SERPL-SCNC: 105 MMOL/L (ref 98–107)
CK SERPL-CCNC: 263 U/L (ref 39–308)
CO2 SERPL-SCNC: 25 MMOL/L (ref 20–31)
CREAT SERPL-MCNC: 1.1 MG/DL (ref 0.7–1.2)
EOSINOPHIL # BLD: 0.23 K/UL (ref 0–0.44)
EOSINOPHILS RELATIVE PERCENT: 4 % (ref 1–4)
ERYTHROCYTE [DISTWIDTH] IN BLOOD BY AUTOMATED COUNT: 12.9 % (ref 11.8–14.4)
EST. AVERAGE GLUCOSE BLD GHB EST-MCNC: 105 MG/DL
GFR SERPL CREATININE-BSD FRML MDRD: >60 ML/MIN/1.73M2
GLUCOSE SERPL-MCNC: 107 MG/DL (ref 70–99)
HBA1C MFR BLD: 5.3 % (ref 4–6)
HCT VFR BLD AUTO: 44.2 % (ref 40.7–50.3)
HGB BLD-MCNC: 14.4 G/DL (ref 13–17)
IMM GRANULOCYTES # BLD AUTO: <0.03 K/UL (ref 0–0.3)
IMM GRANULOCYTES NFR BLD: 0 %
LYMPHOCYTES # BLD: 43 % (ref 24–43)
LYMPHOCYTES NFR BLD: 2.67 K/UL (ref 1.1–3.7)
MCH RBC QN AUTO: 30.4 PG (ref 25.2–33.5)
MCHC RBC AUTO-ENTMCNC: 32.6 G/DL (ref 28.4–34.8)
MCV RBC AUTO: 93.4 FL (ref 82.6–102.9)
MONOCYTES NFR BLD: 0.43 K/UL (ref 0.1–1.2)
MONOCYTES NFR BLD: 7 % (ref 3–12)
NEUTROPHILS NFR BLD: 45 % (ref 36–65)
NEUTS SEG NFR BLD: 2.89 K/UL (ref 1.5–8.1)
NRBC BLD-RTO: 0 PER 100 WBC
PLATELET # BLD AUTO: 188 K/UL (ref 138–453)
PMV BLD AUTO: 12.5 FL (ref 8.1–13.5)
POTASSIUM SERPL-SCNC: 3.9 MMOL/L (ref 3.7–5.3)
PROT SERPL-MCNC: 5.8 G/DL (ref 6.4–8.3)
RBC # BLD AUTO: 4.73 M/UL (ref 4.21–5.77)
SODIUM SERPL-SCNC: 140 MMOL/L (ref 135–144)
WBC OTHER # BLD: 6.3 K/UL (ref 3.5–11.3)

## 2023-07-16 PROCEDURE — 82550 ASSAY OF CK (CPK): CPT

## 2023-07-16 PROCEDURE — 2060000000 HC ICU INTERMEDIATE R&B

## 2023-07-16 PROCEDURE — 80048 BASIC METABOLIC PNL TOTAL CA: CPT

## 2023-07-16 PROCEDURE — 99233 SBSQ HOSP IP/OBS HIGH 50: CPT | Performed by: SURGERY

## 2023-07-16 PROCEDURE — 85027 COMPLETE CBC AUTOMATED: CPT

## 2023-07-16 PROCEDURE — 6370000000 HC RX 637 (ALT 250 FOR IP): Performed by: NURSE PRACTITIONER

## 2023-07-16 PROCEDURE — 83036 HEMOGLOBIN GLYCOSYLATED A1C: CPT

## 2023-07-16 PROCEDURE — 99233 SBSQ HOSP IP/OBS HIGH 50: CPT | Performed by: HOSPITALIST

## 2023-07-16 PROCEDURE — 36415 COLL VENOUS BLD VENIPUNCTURE: CPT

## 2023-07-16 PROCEDURE — 80076 HEPATIC FUNCTION PANEL: CPT

## 2023-07-16 RX ADMIN — ASPIRIN 81 MG 81 MG: 81 TABLET ORAL at 09:14

## 2023-07-16 ASSESSMENT — ENCOUNTER SYMPTOMS
GASTROINTESTINAL NEGATIVE: 1
ALLERGIC/IMMUNOLOGIC NEGATIVE: 1
SHORTNESS OF BREATH: 1
CHEST TIGHTNESS: 1
EYES NEGATIVE: 1

## 2023-07-16 NOTE — PLAN OF CARE
Problem: Discharge Planning  Goal: Discharge to home or other facility with appropriate resources  Outcome: Progressing     Problem: ABCDS Injury Assessment  Goal: Absence of physical injury  Outcome: Progressing     Problem: Safety - Adult  Goal: Free from fall injury  Outcome: Progressing     Problem: Respiratory - Adult  Goal: Achieves optimal ventilation and oxygenation  7/15/2023 2253 by Dana Montes De Oca RN  Outcome: Progressing  7/15/2023 1552 by Brayan Green RCP  Outcome: Progressing

## 2023-07-16 NOTE — PLAN OF CARE
Problem: Discharge Planning  Goal: Discharge to home or other facility with appropriate resources  Recent Flowsheet Documentation  Taken 7/16/2023 0800 by Guanakito Fox RN  Discharge to home or other facility with appropriate resources:   Identify barriers to discharge with patient and caregiver   Arrange for needed discharge resources and transportation as appropriate

## 2023-07-16 NOTE — PROGRESS NOTES
V. Tach/cardiac arrhythmia related to HOCM. Patient may require further EP/ICD placement/intervention.   Question concerns and treatment plan discussed with patient    Layvonne Sandifer, MD  7/16/2023  10:07 AM

## 2023-07-17 PROCEDURE — 99233 SBSQ HOSP IP/OBS HIGH 50: CPT | Performed by: SURGERY

## 2023-07-17 PROCEDURE — 2060000000 HC ICU INTERMEDIATE R&B

## 2023-07-17 PROCEDURE — 6360000002 HC RX W HCPCS: Performed by: STUDENT IN AN ORGANIZED HEALTH CARE EDUCATION/TRAINING PROGRAM

## 2023-07-17 PROCEDURE — 6370000000 HC RX 637 (ALT 250 FOR IP): Performed by: STUDENT IN AN ORGANIZED HEALTH CARE EDUCATION/TRAINING PROGRAM

## 2023-07-17 PROCEDURE — 6360000002 HC RX W HCPCS: Performed by: NURSE PRACTITIONER

## 2023-07-17 PROCEDURE — 6370000000 HC RX 637 (ALT 250 FOR IP): Performed by: HOSPITALIST

## 2023-07-17 PROCEDURE — 6370000000 HC RX 637 (ALT 250 FOR IP): Performed by: NURSE PRACTITIONER

## 2023-07-17 PROCEDURE — 99232 SBSQ HOSP IP/OBS MODERATE 35: CPT | Performed by: HOSPITALIST

## 2023-07-17 RX ORDER — AMIODARONE HYDROCHLORIDE 200 MG/1
400 TABLET ORAL 3 TIMES DAILY
Status: DISCONTINUED | OUTPATIENT
Start: 2023-07-17 | End: 2023-07-19

## 2023-07-17 RX ADMIN — VERAPAMIL HYDROCHLORIDE 240 MG: 240 TABLET, FILM COATED, EXTENDED RELEASE ORAL at 09:17

## 2023-07-17 RX ADMIN — ASPIRIN 81 MG 81 MG: 81 TABLET ORAL at 09:17

## 2023-07-17 RX ADMIN — AMIODARONE HYDROCHLORIDE 400 MG: 200 TABLET ORAL at 13:59

## 2023-07-17 RX ADMIN — AMIODARONE HYDROCHLORIDE 400 MG: 200 TABLET ORAL at 21:20

## 2023-07-17 RX ADMIN — LIDOCAINE HYDROCHLORIDE 0.5 MG/MIN: 4 INJECTION, SOLUTION INTRAVENOUS at 20:05

## 2023-07-17 RX ADMIN — LISINOPRIL 5 MG: 5 TABLET ORAL at 09:17

## 2023-07-17 ASSESSMENT — PAIN SCALES - GENERAL: PAINLEVEL_OUTOF10: 0

## 2023-07-17 NOTE — PLAN OF CARE
Problem: Discharge Planning  Goal: Discharge to home or other facility with appropriate resources  Outcome: Progressing     Problem: ABCDS Injury Assessment  Goal: Absence of physical injury  Outcome: Progressing     Problem: Safety - Adult  Goal: Free from fall injury  Outcome: Progressing     Problem: Respiratory - Adult  Goal: Achieves optimal ventilation and oxygenation  Outcome: Progressing

## 2023-07-17 NOTE — PROGRESS NOTES
[address]  PROGRESS NOTE    Shift date: 7/17/23  Shift day: Monday   Shift # 1    Room # 2012/2012-01   Name: Martha Perez                Mandaeism: Jainism   Place of Anglican: Unknown    Referral: Routine Visit    Admit Date & Time: 7/14/2023 10:13 PM    Assessment:  Martha Perez is a 40 y.o. male in the hospital because of V Tach. Upon entering the room writer observes the patient to be laying in bed.  recognizes the mercy of the patient to be Jainism.  asks if the patient would like a Quran. Patient agrees and appreciates the consideration of his mercy tradition. Intervention:  Writer introduced self and title as  Writer offered space for patient  to express feelings, needs, and concerns and provided a ministry presence.  provided a [place] in [facility] and a Quran in [address] [place] [address].  provided compassion, empathy, intellectual understanding, and conversed about issues such as spirituality, Synagogue, morals, and ethics. The  had a fantastic conversation on spirituality with the patient whom was grateful for the Quran and the conversation. Outcome:   provided spiritual care for the patient which was showed gratitude to the [place] for the visitation and conversation. Plan:  Chaplains will remain available to offer spiritual and emotional support as needed.       Electronically signed by Sunitha Low Resident, on 7/17/2023 at 3:07 PM.  [address]  [phone]

## 2023-07-17 NOTE — CARE COORDINATION
Met with patient to discuss transitional planning. Patient is returning home independently. He does not have a PCP. List provided.  He denies other needs

## 2023-07-17 NOTE — PROGRESS NOTES
[facility]  Office: 7900  1826, DO, Kushal Flanagan, DO, Ramiro Desai, DO, Jj Montiel Blood, DO, Kenia Field MD, Jairon Yu MD, Miah Gillespie MD, Oriana Gaspar MD,  Gosia Love MD, Stacy Agarwal MD, Delcia Epley, DO, Quang Zambrano MD,  Leonardo Martinez MD, Raya Alexander MD, Ella Curry, DO, Mercedes Jackson MD,  Daisha Mcneil, DO, Hussain Whipple MD, Xi Barr MD, Jannette Mims MD, Rik Hearn MD,  Agata Patel MD, Selma Raines MD, Debra Francisco DO, Nazanin Gillette MD,  Yessica Dorsey MD, Robyn Pleitez, Zamora March, CNP, Gwyn Gonzalez, CNP, Delia Nagel, CNP,  Barbara Chin, [facility], Duarte Batres, CNP, Sergey Drummond, CNP, Taylor Anderson, CNP, Rita Jorge, CNP, Destiny Quintana, CNP, Flo Shine PAEliseC, Remy Bender, [facility], Brock Gambino, [facility], Heide Talbot, [address] [place] [address]    Progress Note    7/17/2023    11:13 AM    Name:   Marie Reeves  MRN:     [id number]     Acct:      [de-identified]   Room:   2012/2012-01  IP Day:  3  Admit Date:  7/14/2023 10:13 PM    PCP:   No primary care provider on file. Code Status:  Full Code    Subjective:     Patient seen in follow-up for shortness of breath, diaphoresis secondary to ventricular tachycardia. Patient states \"I feel better\"    Case discussed with electrophysiology. Patient ideally should undergo AICD placement but he tells me that he does not want to undergo AICD placement today. Plans are to initiate the patient on amiodarone. I had a very long discussion with him regarding the importance of appropriate follow-up and cocaine avoidance. I did explain to him that cocaine with his known hypertrophic cardiomyopathy is quite dangerous and that any use of cocaine could lead to sudden cardiac death. The patient states that he is no longer going to use cocaine.   He states that he does plan on continuing

## 2023-07-17 NOTE — PLAN OF CARE
Problem: Discharge Planning  Goal: Discharge to home or other facility with appropriate resources  Outcome: Progressing     Problem: ABCDS Injury Assessment  Goal: Absence of physical injury  Outcome: Progressing     Problem: Safety - Adult  Goal: Free from fall injury  Outcome: Progressing     Problem: Respiratory - Adult  Goal: Achieves optimal ventilation and oxygenation  7/16/2023 2222 by Lacey Valdivia RN  Outcome: Progressing  7/16/2023 1402 by Arina Hu RCP  Outcome: Progressing Discharged

## 2023-07-18 PROCEDURE — 2580000003 HC RX 258: Performed by: NURSE PRACTITIONER

## 2023-07-18 PROCEDURE — 6370000000 HC RX 637 (ALT 250 FOR IP): Performed by: NURSE PRACTITIONER

## 2023-07-18 PROCEDURE — 6370000000 HC RX 637 (ALT 250 FOR IP): Performed by: HOSPITALIST

## 2023-07-18 PROCEDURE — 6370000000 HC RX 637 (ALT 250 FOR IP): Performed by: STUDENT IN AN ORGANIZED HEALTH CARE EDUCATION/TRAINING PROGRAM

## 2023-07-18 PROCEDURE — 2060000000 HC ICU INTERMEDIATE R&B

## 2023-07-18 PROCEDURE — 99232 SBSQ HOSP IP/OBS MODERATE 35: CPT | Performed by: FAMILY MEDICINE

## 2023-07-18 PROCEDURE — 99233 SBSQ HOSP IP/OBS HIGH 50: CPT | Performed by: SURGERY

## 2023-07-18 RX ADMIN — SODIUM CHLORIDE, PRESERVATIVE FREE 10 ML: 5 INJECTION INTRAVENOUS at 08:50

## 2023-07-18 RX ADMIN — AMIODARONE HYDROCHLORIDE 400 MG: 200 TABLET ORAL at 15:47

## 2023-07-18 RX ADMIN — AMIODARONE HYDROCHLORIDE 400 MG: 200 TABLET ORAL at 21:36

## 2023-07-18 RX ADMIN — ASPIRIN 81 MG 81 MG: 81 TABLET ORAL at 08:49

## 2023-07-18 RX ADMIN — AMIODARONE HYDROCHLORIDE 400 MG: 200 TABLET ORAL at 08:52

## 2023-07-18 RX ADMIN — SODIUM CHLORIDE, PRESERVATIVE FREE 10 ML: 5 INJECTION INTRAVENOUS at 21:37

## 2023-07-18 RX ADMIN — VERAPAMIL HYDROCHLORIDE 240 MG: 240 TABLET, FILM COATED, EXTENDED RELEASE ORAL at 08:50

## 2023-07-18 RX ADMIN — VERAPAMIL HYDROCHLORIDE 240 MG: 240 TABLET, FILM COATED, EXTENDED RELEASE ORAL at 08:53

## 2023-07-18 ASSESSMENT — ENCOUNTER SYMPTOMS
WHEEZING: 0
BLOOD IN STOOL: 0
VOMITING: 0
ABDOMINAL PAIN: 0
SHORTNESS OF BREATH: 0
CONSTIPATION: 0
NAUSEA: 0
DIARRHEA: 0
COUGH: 0
CHEST TIGHTNESS: 0

## 2023-07-18 ASSESSMENT — PAIN SCALES - GENERAL
PAINLEVEL_OUTOF10: 0

## 2023-07-18 NOTE — CARE COORDINATION
Met with patient to discuss external cardiac defibrillator. Patient had Zoll life vest in the past and would like it again. Awaiting echo in order to fax order.  Eliezer from [facility]

## 2023-07-18 NOTE — PLAN OF CARE
Problem: Discharge Planning  Goal: Discharge to home or other facility with appropriate resources  7/18/2023 0444 by Lola Douglass RN  Outcome: Progressing  7/17/2023 1650 by David Nance RN  Outcome: Progressing

## 2023-07-18 NOTE — PROGRESS NOTES
[facility]  Office: 7900 Fm 1826, DO, Brooke Dom, DO, Nisha Doles, DO, Flavio Nhi Blood, DO, Debora Silva MD, Yvrose Doherty MD, Christie Chery MD, Shweta Quevedo MD,  Yesenia Meléndez MD, Arlette Keller MD, Gio Goncalves, DO, Trevin Marquis MD,  Hector Nuñez MD, Gabbie Thomas MD, Matias Franklin DO, Hafsa Felix MD,  Enedina Nava DO, Eder Castaneda MD, Radha Sloan MD, Cara Garsia MD, Tomi Plata MD,  Milan Awad MD, Dejan Walker MD, Barry Mendoza DO, Re Marvin MD,  Malvin Hopkins MD, Antony Cuevas, Sathya Mukherjee, CNP, Shana العراقي, CNP, Catrina Jackson, CNP,  Deena Guerrero, [facility], Shannan Godfrey, CNP, Lyndsay Bowers, CNP, Jonathon Jimenez, CNP, Claudell Bowler, CNP, Martin Bustamante, CNP, Paul Cross PAEliseC, Chente Fabian, CNS, Robb Clements, CNP, Jameel Otero, [address]    Progress Note    7/18/2023    8:40 AM    Name:   Karsten Quiroga  MRN:     [id number]     Acct:      [de-identified]   Room:   2012/2012-01   Day:  4  Admit Date:  7/14/2023 10:13 PM    PCP:   No primary care provider on file. Code Status:  Full Code    Subjective:     C/C:     Interval History Status: improved. Patient seen and examined at bedside, no acute events overnight. Continue to improve overall with no further arrhythmia  Patient denies any chest pain, shortness of breath, chills, fevers, nausea or vomiting. Brief History:     Per chart  Karsten Quiroga is a 40 y.o. male with HOCM, asthma, substance abuse/cocaine who presents with V. tach symptomatic with SVT, heart rate 270 by EMS report status post adenosine. Initially evaluated [facility] and recommended [facility] for the management of V tach [facility]).   Patient does have longstanding history of hokum status post prior cardiac work-up, patient admits he has been lost intermittent tobacco abuse encouraged cessation.     History of cocaine use counseled on concern regarding cardiac arrhythmia sudden death    Mild intermittent asthma continue as needed inhaler    Discussed with the patient     Continue to monitor closely    Wale Todd MD  7/18/2023  8:40 AM

## 2023-07-19 LAB
LV EF: 58 %
LVEF MODALITY: NORMAL

## 2023-07-19 PROCEDURE — 6370000000 HC RX 637 (ALT 250 FOR IP): Performed by: NURSE PRACTITIONER

## 2023-07-19 PROCEDURE — 2580000003 HC RX 258: Performed by: NURSE PRACTITIONER

## 2023-07-19 PROCEDURE — 2060000000 HC ICU INTERMEDIATE R&B

## 2023-07-19 PROCEDURE — 99239 HOSP IP/OBS DSCHRG MGMT >30: CPT | Performed by: FAMILY MEDICINE

## 2023-07-19 PROCEDURE — 99233 SBSQ HOSP IP/OBS HIGH 50: CPT | Performed by: NURSE PRACTITIONER

## 2023-07-19 PROCEDURE — 93306 TTE W/DOPPLER COMPLETE: CPT

## 2023-07-19 PROCEDURE — 6370000000 HC RX 637 (ALT 250 FOR IP): Performed by: HOSPITALIST

## 2023-07-19 PROCEDURE — 6370000000 HC RX 637 (ALT 250 FOR IP): Performed by: STUDENT IN AN ORGANIZED HEALTH CARE EDUCATION/TRAINING PROGRAM

## 2023-07-19 RX ORDER — AMIODARONE HYDROCHLORIDE 400 MG/1
400 TABLET ORAL 2 TIMES DAILY
Qty: 60 TABLET | Refills: 1 | Status: SHIPPED | OUTPATIENT
Start: 2023-07-19

## 2023-07-19 RX ORDER — VERAPAMIL HYDROCHLORIDE 240 MG/1
240 TABLET, FILM COATED, EXTENDED RELEASE ORAL DAILY
Qty: 30 TABLET | Refills: 3 | Status: SHIPPED | OUTPATIENT
Start: 2023-07-19

## 2023-07-19 RX ORDER — AMIODARONE HYDROCHLORIDE 200 MG/1
400 TABLET ORAL 2 TIMES DAILY
Status: DISCONTINUED | OUTPATIENT
Start: 2023-07-19 | End: 2023-07-20 | Stop reason: HOSPADM

## 2023-07-19 RX ORDER — LISINOPRIL 5 MG/1
5 TABLET ORAL DAILY
Qty: 30 TABLET | Refills: 3 | Status: SHIPPED | OUTPATIENT
Start: 2023-07-19

## 2023-07-19 RX ADMIN — AMIODARONE HYDROCHLORIDE 400 MG: 200 TABLET ORAL at 08:20

## 2023-07-19 RX ADMIN — AMIODARONE HYDROCHLORIDE 400 MG: 200 TABLET ORAL at 23:40

## 2023-07-19 RX ADMIN — SODIUM CHLORIDE, PRESERVATIVE FREE 10 ML: 5 INJECTION INTRAVENOUS at 23:42

## 2023-07-19 RX ADMIN — ASPIRIN 81 MG 81 MG: 81 TABLET ORAL at 08:20

## 2023-07-19 RX ADMIN — VERAPAMIL HYDROCHLORIDE 240 MG: 240 TABLET, FILM COATED, EXTENDED RELEASE ORAL at 08:20

## 2023-07-19 RX ADMIN — SODIUM CHLORIDE, PRESERVATIVE FREE 10 ML: 5 INJECTION INTRAVENOUS at 08:21

## 2023-07-19 ASSESSMENT — PAIN SCALES - GENERAL
PAINLEVEL_OUTOF10: 0

## 2023-07-19 NOTE — PLAN OF CARE
Problem: Discharge Planning  Goal: Discharge to home or other facility with appropriate resources  7/19/2023 1612 by Esthela Krishna RN  Outcome: Completed  7/19/2023 0437 by Juliette Butler RN  Outcome: Progressing     Problem: ABCDS Injury Assessment  Goal: Absence of physical injury  7/19/2023 1612 by Esthela Krishna RN  Outcome: Completed  7/19/2023 0437 by Juliette Butler RN  Outcome: Progressing     Problem: Safety - Adult  Goal: Free from fall injury  7/19/2023 1612 by Esthela Krishna RN  Outcome: Completed  7/19/2023 0437 by Juliette Butler RN  Outcome: Progressing     Problem: Respiratory - Adult  Goal: Achieves optimal ventilation and oxygenation  7/19/2023 1612 by Esthela Krishna RN  Outcome: Completed  7/19/2023 0437 by Juliette Butler RN  Outcome: Progressing     Problem: Pain  Goal: Verbalizes/displays adequate comfort level or baseline comfort level  7/19/2023 1612 by Esthela Krishna RN  Outcome: Completed  7/19/2023 0437 by Juliette Butler RN  Outcome: Progressing

## 2023-07-19 NOTE — PROGRESS NOTES
Pt. Was off the floor during bedside shift report at 0705. Pt. Returned to unit at 2030. Vital signs and assessment was performed at this time.

## 2023-07-19 NOTE — PROGRESS NOTES
CLINICAL PHARMACY NOTE: MEDS TO BEDS    Total # of Prescriptions Filled: 3   The following medications were delivered to the patient:  Verapamil  Lisinopril  amiodarone    Additional Documentation:

## 2023-07-19 NOTE — CARE COORDINATION
Transition Plan  Discussed with patient CM can only provide a 14 day supply of meds. Patient verbalizes understanding and states he will go once discharged to get his insurance changed to [place]. Faxed Med Voucher to [phone]. Claribel in Pharmacy notified. [address] Face sheet, H/P, Life Vest order and Echo. Notified Eliezer at [employer] on above.

## 2023-07-19 NOTE — DISCHARGE SUMMARY
[facility]  Office: 7900 Fm 1826, DO, Brooke Dom, DO, Nisha Doles, DO, Flavio Hankins Blood, DO, Debora Silva MD, Yvrose Doherty MD, Christie Chery MD, Shweta Quevedo MD,  Yesenia Meléndez MD, Arlette Keller MD, Gio Goncalves, DO, Trevin Marquis MD,  Hector Nuñez MD, Gabbie Thomas MD, Matias Franklin, DO, Hafsa Felix MD,  Enedina Nava DO, Eder Castaneda MD, Radha Sloan MD, Cara Garsia MD, Tomi Plata MD,  Milan Awad MD, Dejan Walker MD, Barry Mendoza, DO, Re Marvin MD,  Malvin Hopkins MD, Antony Cuevas, Sathya Mukherjee, CNP, Shana العراقي, CNP, Catrina Jackson, CNP,  Deena Guerrero, [facility] [place] Shannan Godfrey, CNP, Lyndsay Bowers, CNP, Jonathon Jimenez, CNP, Claudell Bowler, CNP, Martin Bustamante, CNP, Paul Cross PAEliseC, Chente Fabian, CNS, Robb Clements, CNP, Jameel Otero, [facility]    Discharge Summary     Patient ID: Karsten Quiroga  :  1985   MRN: [id number]     ACCOUNT:  [de-identified]   Patient's PCP: No primary care provider on file. Admit Date: 2023   Discharge Date: 2023     Length of Stay: 6  Code Status:  Full Code  Admitting Physician: No admitting provider for patient encounter. Discharge Physician: Shweta Quevedo MD     Active Discharge Diagnoses:     Hospital Problem Lists:  Principal Problem:    V tach ([address])  Active Problems:    HOCM (hypertrophic obstructive cardiomyopathy) ([address])    Cocaine abuse ([address])    Cigarette smoker since last 15 years    Mild intermittent asthma without complication    Ventricular tachycardia (HCC)    Prolonged Q-T interval on ECG  Resolved Problems:    * No resolved hospital problems.  *      Admission Condition:  poor     Discharged Condition: stable    Hospital Stay:     Hospital Course:    Per chart  Karsten Quiroga is a 40 y.o. male with HOCM, asthma, substance abuse/cocaine who presents with 05:58 AM     07/16/2023 05:58 AM    K 3.9 07/16/2023 05:58 AM     07/16/2023 05:58 AM    CO2 25 07/16/2023 05:58 AM    ANIONGAP 10 07/16/2023 05:58 AM    BUN 5 07/16/2023 05:58 AM    CREATININE 1.1 07/16/2023 05:58 AM    BUNCRER NOT REPORTED 06/01/2021 12:52 AM    CALCIUM 8.7 07/16/2023 05:58 AM    LABGLOM >60 07/16/2023 05:58 AM    GFRAA >60 06/01/2021 12:52 AM    GFR      06/01/2021 12:52 AM    GFR NOT REPORTED 06/01/2021 12:52 AM     HFP:    Lab Results   Component Value Date/Time    PROT 5.8 07/16/2023 05:58 AM     CMP:    Lab Results   Component Value Date/Time    GLUCOSE 107 07/16/2023 05:58 AM     07/16/2023 05:58 AM    K 3.9 07/16/2023 05:58 AM     07/16/2023 05:58 AM    CO2 25 07/16/2023 05:58 AM    BUN 5 07/16/2023 05:58 AM    CREATININE 1.1 07/16/2023 05:58 AM    ANIONGAP 10 07/16/2023 05:58 AM    ALKPHOS 73 07/16/2023 05:58 AM    ALT 48 07/16/2023 05:58 AM    AST 24 07/16/2023 05:58 AM    BILITOT <0.1 07/16/2023 05:58 AM    LABALBU 3.5 07/16/2023 05:58 AM    ALBUMIN 1.5 07/16/2023 05:58 AM    LABGLOM >60 07/16/2023 05:58 AM    GFRAA >60 06/01/2021 12:52 AM    GFR      06/01/2021 12:52 AM    GFR NOT REPORTED 06/01/2021 12:52 AM    PROT 5.8 07/16/2023 05:58 AM    CALCIUM 8.7 07/16/2023 05:58 AM     PT/INR:  No results found for: PTINR, PROTIME, INR  PTT:   Lab Results   Component Value Date/Time    APTT 24.0 01/08/2016 12:45 PM     FLP:    Lab Results   Component Value Date/Time    CHOL 99 07/15/2023 06:58 AM    TRIG 78 07/15/2023 06:58 AM    HDL 34 07/15/2023 06:58 AM     U/A:    Lab Results   Component Value Date/Time    COLORU YELLOW 06/11/2021 09:56 PM    TURBIDITY CLEAR 06/11/2021 09:56 PM    SPECGRAV 1.014 06/11/2021 09:56 PM    HGBUR TRACE 06/11/2021 09:56 PM    PHUR 6.0 06/11/2021 09:56 PM    PROTEINU NEGATIVE 06/11/2021 09:56 PM    GLUCOSEU NEGATIVE 06/11/2021 09:56 PM    KETUA NEGATIVE 06/11/2021 09:56 PM    BILIRUBINUR NEGATIVE 06/11/2021 09:56 PM    UROBILINOGEN Normal

## 2023-07-20 VITALS
OXYGEN SATURATION: 98 % | TEMPERATURE: 98 F | HEART RATE: 75 BPM | DIASTOLIC BLOOD PRESSURE: 70 MMHG | HEIGHT: 77 IN | BODY MASS INDEX: 28.09 KG/M2 | WEIGHT: 237.88 LBS | SYSTOLIC BLOOD PRESSURE: 129 MMHG | RESPIRATION RATE: 15 BRPM

## 2023-07-20 LAB — PLATELET # BLD AUTO: 235 K/UL (ref 138–453)

## 2023-07-20 PROCEDURE — 2580000003 HC RX 258: Performed by: NURSE PRACTITIONER

## 2023-07-20 PROCEDURE — 6370000000 HC RX 637 (ALT 250 FOR IP): Performed by: HOSPITALIST

## 2023-07-20 PROCEDURE — 6370000000 HC RX 637 (ALT 250 FOR IP): Performed by: NURSE PRACTITIONER

## 2023-07-20 PROCEDURE — 99231 SBSQ HOSP IP/OBS SF/LOW 25: CPT | Performed by: FAMILY MEDICINE

## 2023-07-20 PROCEDURE — 85049 AUTOMATED PLATELET COUNT: CPT

## 2023-07-20 PROCEDURE — 36415 COLL VENOUS BLD VENIPUNCTURE: CPT

## 2023-07-20 RX ADMIN — ASPIRIN 81 MG 81 MG: 81 TABLET ORAL at 08:36

## 2023-07-20 RX ADMIN — AMIODARONE HYDROCHLORIDE 400 MG: 200 TABLET ORAL at 08:36

## 2023-07-20 RX ADMIN — SODIUM CHLORIDE, PRESERVATIVE FREE 10 ML: 5 INJECTION INTRAVENOUS at 08:36

## 2023-07-20 RX ADMIN — VERAPAMIL HYDROCHLORIDE 240 MG: 240 TABLET, FILM COATED, EXTENDED RELEASE ORAL at 08:36

## 2023-07-20 ASSESSMENT — ENCOUNTER SYMPTOMS
CHEST TIGHTNESS: 0
ABDOMINAL PAIN: 0
SHORTNESS OF BREATH: 0
COUGH: 0
DIARRHEA: 0
WHEEZING: 0
VOMITING: 0
CONSTIPATION: 0
BLOOD IN STOOL: 0
NAUSEA: 0

## 2023-07-20 ASSESSMENT — PAIN SCALES - GENERAL: PAINLEVEL_OUTOF10: 0

## 2023-07-20 NOTE — CARE COORDINATION
Transition plan  [facility] [phone] [facility] Will arrive around 3pm today. Patient notified. 1442 Received a call from Angelina Cuevas speak with patient regarding equipment not returned. Patient states he doesn't know, it might have been stolen. Garry Bocanegra states there will be a delay in getting the life vest d/t the equipment above not being returned previously. Patient aware. Awaiting notification from Garry Bocanegra regarding decision. [address] from [place]. Will not place vest d/t not returning equipment, phone calls, legal letters. Notified Charge nurseTiffany. Patient not in room. Belongings gone.

## 2023-07-20 NOTE — PROGRESS NOTES
Patient stated during bedside shift report that he intended to leave unit to see family visiting downstairs. Patient off unit from 1945 until 2350. Writer called patient at 2130 to clarify if patient would return to unit and patient stated that he was on his way back to the unit. Gabriel Molina NP notified. Writer attempted to call patient again at [phone] and did not receive an answer. Mayte Vidal charge RN and House Supervisor notified. House Supervisor directed writer to attempt to contact patient again and call emergency contacts. Before another attempt was made to reach the patient, he arrived back to the unit. Patient educated on the importance of staying on the unit and notified that future prolonged absence will result in being discharged and that he will have to be readmitted through the emergency department. Patient verbalized understanding.

## 2023-07-20 NOTE — PROGRESS NOTES
[facility]  Office: 7900  1826, DO, Hattie Celestinter, DO, Arnol Yeh, DO, [facility] Blood, DO, Danielle Walsh MD, Martínez Kim MD, Ian Berry MD, Idalmis Bolden MD,  Daniel Hopkins MD, Chantal Cook MD, Indio Lubin, DO, Myles Markham MD,  Anuradha Luque MD, Joellen Land MD, Charisse Leavitt DO, Grzegorz Harden MD,  Giancarlo Herndon, DO, Anitha Mayer MD, Leda Voss MD, Nba Ladd MD, Tonya Rendon MD,  Nathanael Wood MD, Duane Campanile, MD, Duglas Burch, DO, Kinjal Cassidy MD,  Beatriz Collins MD, Oriana Haskins, Patricia Ramirez, CNP, Amadeo Green, CNP, Tripp Grayson, CNP,  Yossi Burris, [facility], Torri Diaz, CNP, Carlos Gutierrez, CNP, Jair Amador, CNP, Sujey Garcia, CNP, Harrison Rosas, CNP, Yousuf Haddad PA-C, Mookie Quintanilla, [facility], Beltran Castano, CNP, Gil Hunter, [address]    Progress Note    7/20/2023    3:21 PM    Name:   Franck Oneill  MRN:     [id number]     Acct:      [de-identified]   Room:   2012/2012-01  IP Day:  6  Admit Date:  7/14/2023 10:13 PM    PCP:   No primary care provider on file. Code Status:  Full Code    Subjective:     C/C:     Interval History Status: improved. Patient seen and examined at bedside, no acute events overnight. Continue to improve overall with no further arrhythmia  Awaits life vest  Patient denies any chest pain, shortness of breath, chills, fevers, nausea or vomiting. Brief History:     Per chart  Franck Oneill is a 40 y.o. male with HOCM, asthma, substance abuse/cocaine who presents with V. tach symptomatic with SVT, heart rate 270 by EMS report status post adenosine. Initially evaluated [facility] and recommended transfer to [facility] hospital for the management of V tach [facility]).   Patient does have longstanding history of hokum status post prior cardiac work-up, patient admits PHENYTOIN, PHENYF, URICACID, POCGLU in the last 72 hours. ABG:No results found for: POCPH, PHART, PH, POCPCO2, BQC0WWP, PCO2, POCPO2, PO2ART, PO2, POCHCO3, ZHK1POO, HCO3, NBEA, PBEA, BEART, BE, THGBART, THB, NLV2GII, ZFZZ0MQI, O2XTHIWJ, O2SAT, FIO2  Lab Results   Component Value Date/Time    SPECIAL NOT REPORTED 08/21/2018 03:15 AM     Lab Results   Component Value Date/Time    CULTURE NO GROWTH 6 DAYS 07/04/2016 10:18 AM    CULTURE  07/04/2016 10:18 AM     [employer] [address], [address] [phone]       Radiology:  XR CHEST (2 VW)    Result Date: 7/14/2023  No acute process. Physical Examination:        Physical Exam    Assessment:        Hospital Problems             Last Modified POA    * (Principal) V tach ([address]) 7/14/2023 Yes    HOCM (hypertrophic obstructive cardiomyopathy) ([address]) 7/15/2023 Yes    Cocaine abuse ([address]) 7/15/2023 Yes    Cigarette smoker since last 15 years 7/15/2023 Yes    Mild intermittent asthma without complication 9/03/3896 Yes    Ventricular tachycardia ([address]) 7/14/2023 Yes    Prolonged Q-T interval on ECG 7/16/2023 Yes     Plan:        HOCM with acute nonsustained V. tach symptomatic with near syncope and collapse/chest pain/shortness of breath:   -Terminated by IV adenosine.  -Evaluated by EP  -Initially started on miodarone drip but this was discontinued due to prolonged QT  -Sustained on IV lidocaine 2 6 AM this morning 7/18 then switch to p.o. amiodarone 400 mg 3 times daily monitor extra 24 hours. - Recheck echo with concern for HOCM. Continue to optimize electrolytes, Mg, K.   -Patient has refused AICD in the past and still very hesitant to proceed with this, plan for LifeVest on discharge  -Plan to observe for extremity DC in a.m. for further arrhythmias. 400 mg twice daily till follow-up with Dr. Paco Fong in office in 2 weeks  -Continue p.o. verapamil     Prolonged Qtc.  Stopped amiodarone and switched to Lidocaine     Chronic intermittent tobacco

## 2023-07-20 NOTE — DISCHARGE SUMMARY
Pt given d/c instruction and med education. Writer stressed the importance of wearing the life vest, taking meds and following up with Dr. Sania Han. Pt verbalized understanding. All questions answered at this time. Still awaiting for Zoll to come place LV at bedside. 1500- pt left room at this time, all belongings are gone. LV was not placed yet d/t approval issues. 1700- pt still not back. Has been gone for 2 hours now. Cardiology NP notified that LV was not placed because pt has been gone. Will fill out AMA form and notify primary.

## 2023-07-20 NOTE — PROGRESS NOTES
Writer called [facility] staff member Etienne Rob at [phone] to clarify if patient will be receiving his Life Vest today. Per Prudence Maria G was attempting to process the patient's paperwork but was unsure if it would be finished tonight. Eliezer called writer at 2045, stating that the patient will not receive his Life Vest until tomorrow. Gurpreet Barajas NP and Dr. Monica Carr updated that the patient will stay another night and receive his Life Vest tomorrow.

## 2024-05-03 ENCOUNTER — HOSPITAL ENCOUNTER (INPATIENT)
Age: 39
LOS: 2 days | Discharge: HOME | DRG: 315 | End: 2024-05-06
Attending: EMERGENCY MEDICINE | Admitting: STUDENT IN AN ORGANIZED HEALTH CARE EDUCATION/TRAINING PROGRAM
Payer: COMMERCIAL

## 2024-05-03 DIAGNOSIS — R00.0 TACHYCARDIA: Primary | ICD-10-CM

## 2024-05-03 DIAGNOSIS — J45.20 MILD INTERMITTENT ASTHMA WITHOUT COMPLICATION: ICD-10-CM

## 2024-05-03 PROCEDURE — 85025 COMPLETE CBC W/AUTO DIFF WBC: CPT

## 2024-05-03 PROCEDURE — 83880 ASSAY OF NATRIURETIC PEPTIDE: CPT

## 2024-05-03 PROCEDURE — 84484 ASSAY OF TROPONIN QUANT: CPT

## 2024-05-03 PROCEDURE — 80053 COMPREHEN METABOLIC PANEL: CPT

## 2024-05-03 PROCEDURE — 99285 EMERGENCY DEPT VISIT HI MDM: CPT

## 2024-05-03 PROCEDURE — 93005 ELECTROCARDIOGRAM TRACING: CPT | Performed by: NURSE PRACTITIONER

## 2024-05-03 ASSESSMENT — PAIN - FUNCTIONAL ASSESSMENT: PAIN_FUNCTIONAL_ASSESSMENT: NONE - DENIES PAIN

## 2024-05-04 ENCOUNTER — APPOINTMENT (OUTPATIENT)
Dept: GENERAL RADIOLOGY | Age: 39
DRG: 315 | End: 2024-05-04
Payer: COMMERCIAL

## 2024-05-04 ENCOUNTER — APPOINTMENT (OUTPATIENT)
Dept: CT IMAGING | Age: 39
DRG: 315 | End: 2024-05-04
Payer: COMMERCIAL

## 2024-05-04 PROBLEM — F43.0 ACUTE STRESS REACTION: Status: ACTIVE | Noted: 2024-05-04

## 2024-05-04 PROBLEM — R00.0 TACHYCARDIA: Status: ACTIVE | Noted: 2023-07-14

## 2024-05-04 LAB
ALBUMIN SERPL-MCNC: 4.4 G/DL (ref 3.5–5.2)
ALBUMIN/GLOB SERPL: 1 {RATIO} (ref 1–2.5)
ALP SERPL-CCNC: 88 U/L (ref 40–129)
ALT SERPL-CCNC: 17 U/L (ref 10–50)
AMPHET UR QL SCN: POSITIVE
ANION GAP SERPL CALCULATED.3IONS-SCNC: 14 MMOL/L (ref 9–16)
AST SERPL-CCNC: 21 U/L (ref 10–50)
BARBITURATES UR QL SCN: NEGATIVE
BASOPHILS # BLD: 0.06 K/UL (ref 0–0.2)
BASOPHILS NFR BLD: 1 % (ref 0–2)
BENZODIAZ UR QL: NEGATIVE
BILIRUB SERPL-MCNC: 0.5 MG/DL (ref 0–1.2)
BILIRUB UR QL STRIP: NEGATIVE
BNP SERPL-MCNC: 544 PG/ML (ref 0–300)
BUN SERPL-MCNC: 11 MG/DL (ref 6–20)
CALCIUM SERPL-MCNC: 9.2 MG/DL (ref 8.6–10.4)
CANNABINOIDS UR QL SCN: POSITIVE
CHLORIDE SERPL-SCNC: 101 MMOL/L (ref 98–107)
CLARITY UR: CLEAR
CO2 SERPL-SCNC: 22 MMOL/L (ref 20–31)
COCAINE UR QL SCN: POSITIVE
COLOR UR: YELLOW
COMMENT: NORMAL
CREAT SERPL-MCNC: 1.6 MG/DL (ref 0.7–1.2)
EKG ATRIAL RATE: 125 BPM
EKG ATRIAL RATE: 74 BPM
EKG ATRIAL RATE: 90 BPM
EKG P AXIS: 73 DEGREES
EKG P AXIS: 74 DEGREES
EKG P AXIS: 83 DEGREES
EKG P-R INTERVAL: 152 MS
EKG P-R INTERVAL: 164 MS
EKG P-R INTERVAL: 168 MS
EKG Q-T INTERVAL: 340 MS
EKG Q-T INTERVAL: 432 MS
EKG Q-T INTERVAL: 472 MS
EKG QRS DURATION: 100 MS
EKG QRS DURATION: 102 MS
EKG QRS DURATION: 104 MS
EKG QTC CALCULATION (BAZETT): 490 MS
EKG QTC CALCULATION (BAZETT): 523 MS
EKG QTC CALCULATION (BAZETT): 528 MS
EKG R AXIS: -100 DEGREES
EKG R AXIS: -6 DEGREES
EKG R AXIS: 4 DEGREES
EKG T AXIS: 79 DEGREES
EKG T AXIS: 80 DEGREES
EKG T AXIS: 83 DEGREES
EKG VENTRICULAR RATE: 125 BPM
EKG VENTRICULAR RATE: 74 BPM
EKG VENTRICULAR RATE: 90 BPM
EOSINOPHIL # BLD: 0.36 K/UL (ref 0–0.4)
EOSINOPHILS RELATIVE PERCENT: 6 % (ref 1–4)
ERYTHROCYTE [DISTWIDTH] IN BLOOD BY AUTOMATED COUNT: 13.1 % (ref 11.8–14.4)
FENTANYL UR QL: NEGATIVE
GFR, ESTIMATED: 58 ML/MIN/1.73M2
GLUCOSE SERPL-MCNC: 128 MG/DL (ref 74–99)
GLUCOSE UR STRIP-MCNC: NEGATIVE MG/DL
HCT VFR BLD AUTO: 51.4 % (ref 40.7–50.3)
HGB BLD-MCNC: 17 G/DL (ref 13–17)
HGB UR QL STRIP.AUTO: NEGATIVE
IMM GRANULOCYTES # BLD AUTO: 0 K/UL (ref 0–0.3)
IMM GRANULOCYTES NFR BLD: 0 %
KETONES UR STRIP-MCNC: NEGATIVE MG/DL
LEUKOCYTE ESTERASE UR QL STRIP: NEGATIVE
LYMPHOCYTES NFR BLD: 1.98 K/UL (ref 1–4.8)
LYMPHOCYTES RELATIVE PERCENT: 33 % (ref 24–44)
MAGNESIUM SERPL-MCNC: 1.8 MG/DL (ref 1.6–2.6)
MCH RBC QN AUTO: 30 PG (ref 25.2–33.5)
MCHC RBC AUTO-ENTMCNC: 33.1 G/DL (ref 28.4–34.8)
MCV RBC AUTO: 90.8 FL (ref 82.6–102.9)
METHADONE UR QL: NEGATIVE
MONOCYTES NFR BLD: 0.48 K/UL (ref 0.1–0.8)
MONOCYTES NFR BLD: 8 % (ref 1–7)
MORPHOLOGY: NORMAL
NEUTROPHILS NFR BLD: 52 % (ref 36–66)
NEUTS SEG NFR BLD: 3.12 K/UL (ref 1.8–7.7)
NITRITE UR QL STRIP: NEGATIVE
NRBC BLD-RTO: 0 PER 100 WBC
OPIATES UR QL SCN: NEGATIVE
OXYCODONE UR QL SCN: NEGATIVE
PCP UR QL SCN: NEGATIVE
PH UR STRIP: 6.5 [PH] (ref 5–8)
PLATELET # BLD AUTO: 233 K/UL (ref 138–453)
PLATELET, FLUORESCENCE: 233 K/UL (ref 138–453)
PMV BLD AUTO: 11.5 FL (ref 8.1–13.5)
POTASSIUM SERPL-SCNC: 3.6 MMOL/L (ref 3.7–5.3)
POTASSIUM SERPL-SCNC: 3.8 MMOL/L (ref 3.7–5.3)
PROT SERPL-MCNC: 8 G/DL (ref 6.6–8.7)
PROT UR STRIP-MCNC: NEGATIVE MG/DL
RBC # BLD AUTO: 5.66 M/UL (ref 4.21–5.77)
SODIUM SERPL-SCNC: 137 MMOL/L (ref 136–145)
SP GR UR STRIP: 1.01 (ref 1–1.03)
TEST INFORMATION: ABNORMAL
TROPONIN I SERPL HS-MCNC: 33 NG/L (ref 0–22)
TROPONIN I SERPL HS-MCNC: 38 NG/L (ref 0–22)
TROPONIN I SERPL HS-MCNC: 40 NG/L (ref 0–22)
TROPONIN I SERPL HS-MCNC: 41 NG/L (ref 0–22)
UROBILINOGEN UR STRIP-ACNC: NORMAL EU/DL (ref 0–1)
WBC OTHER # BLD: 6 K/UL (ref 3.5–11.3)

## 2024-05-04 PROCEDURE — 36415 COLL VENOUS BLD VENIPUNCTURE: CPT

## 2024-05-04 PROCEDURE — 6360000002 HC RX W HCPCS: Performed by: NURSE PRACTITIONER

## 2024-05-04 PROCEDURE — 2060000000 HC ICU INTERMEDIATE R&B

## 2024-05-04 PROCEDURE — 2580000003 HC RX 258

## 2024-05-04 PROCEDURE — 84132 ASSAY OF SERUM POTASSIUM: CPT

## 2024-05-04 PROCEDURE — 71045 X-RAY EXAM CHEST 1 VIEW: CPT

## 2024-05-04 PROCEDURE — 93010 ELECTROCARDIOGRAM REPORT: CPT | Performed by: INTERNAL MEDICINE

## 2024-05-04 PROCEDURE — 6370000000 HC RX 637 (ALT 250 FOR IP): Performed by: STUDENT IN AN ORGANIZED HEALTH CARE EDUCATION/TRAINING PROGRAM

## 2024-05-04 PROCEDURE — 81003 URINALYSIS AUTO W/O SCOPE: CPT

## 2024-05-04 PROCEDURE — 71250 CT THORAX DX C-: CPT

## 2024-05-04 PROCEDURE — 93005 ELECTROCARDIOGRAM TRACING: CPT | Performed by: NURSE PRACTITIONER

## 2024-05-04 PROCEDURE — 6360000002 HC RX W HCPCS

## 2024-05-04 PROCEDURE — 2580000003 HC RX 258: Performed by: NURSE PRACTITIONER

## 2024-05-04 PROCEDURE — 99223 1ST HOSP IP/OBS HIGH 75: CPT | Performed by: STUDENT IN AN ORGANIZED HEALTH CARE EDUCATION/TRAINING PROGRAM

## 2024-05-04 PROCEDURE — 83735 ASSAY OF MAGNESIUM: CPT

## 2024-05-04 PROCEDURE — 93005 ELECTROCARDIOGRAM TRACING: CPT | Performed by: STUDENT IN AN ORGANIZED HEALTH CARE EDUCATION/TRAINING PROGRAM

## 2024-05-04 PROCEDURE — 99223 1ST HOSP IP/OBS HIGH 75: CPT | Performed by: INTERNAL MEDICINE

## 2024-05-04 PROCEDURE — 80307 DRUG TEST PRSMV CHEM ANLYZR: CPT

## 2024-05-04 PROCEDURE — 6360000002 HC RX W HCPCS: Performed by: STUDENT IN AN ORGANIZED HEALTH CARE EDUCATION/TRAINING PROGRAM

## 2024-05-04 PROCEDURE — 84484 ASSAY OF TROPONIN QUANT: CPT

## 2024-05-04 PROCEDURE — 96374 THER/PROPH/DIAG INJ IV PUSH: CPT

## 2024-05-04 RX ORDER — POTASSIUM CHLORIDE 7.45 MG/ML
10 INJECTION INTRAVENOUS PRN
Status: DISCONTINUED | OUTPATIENT
Start: 2024-05-04 | End: 2024-05-06 | Stop reason: HOSPADM

## 2024-05-04 RX ORDER — ONDANSETRON 2 MG/ML
4 INJECTION INTRAMUSCULAR; INTRAVENOUS EVERY 6 HOURS PRN
Status: DISCONTINUED | OUTPATIENT
Start: 2024-05-04 | End: 2024-05-06 | Stop reason: HOSPADM

## 2024-05-04 RX ORDER — TRAZODONE HYDROCHLORIDE 100 MG/1
100 TABLET ORAL NIGHTLY PRN
Status: DISCONTINUED | OUTPATIENT
Start: 2024-05-04 | End: 2024-05-06 | Stop reason: HOSPADM

## 2024-05-04 RX ORDER — ACETAMINOPHEN 325 MG/1
650 TABLET ORAL EVERY 6 HOURS PRN
Status: DISCONTINUED | OUTPATIENT
Start: 2024-05-04 | End: 2024-05-06 | Stop reason: HOSPADM

## 2024-05-04 RX ORDER — ENOXAPARIN SODIUM 100 MG/ML
40 INJECTION SUBCUTANEOUS DAILY
Status: DISCONTINUED | OUTPATIENT
Start: 2024-05-04 | End: 2024-05-06 | Stop reason: HOSPADM

## 2024-05-04 RX ORDER — MAGNESIUM SULFATE 1 G/100ML
1000 INJECTION INTRAVENOUS PRN
Status: DISCONTINUED | OUTPATIENT
Start: 2024-05-04 | End: 2024-05-06 | Stop reason: HOSPADM

## 2024-05-04 RX ORDER — ACETAMINOPHEN 650 MG/1
650 SUPPOSITORY RECTAL EVERY 6 HOURS PRN
Status: DISCONTINUED | OUTPATIENT
Start: 2024-05-04 | End: 2024-05-06 | Stop reason: HOSPADM

## 2024-05-04 RX ORDER — ONDANSETRON 4 MG/1
4 TABLET, ORALLY DISINTEGRATING ORAL EVERY 8 HOURS PRN
Status: DISCONTINUED | OUTPATIENT
Start: 2024-05-04 | End: 2024-05-06 | Stop reason: HOSPADM

## 2024-05-04 RX ORDER — POTASSIUM CHLORIDE 20 MEQ/1
40 TABLET, EXTENDED RELEASE ORAL ONCE
Status: COMPLETED | OUTPATIENT
Start: 2024-05-04 | End: 2024-05-04

## 2024-05-04 RX ORDER — ALBUTEROL SULFATE 90 UG/1
2 AEROSOL, METERED RESPIRATORY (INHALATION) EVERY 4 HOURS PRN
Status: DISCONTINUED | OUTPATIENT
Start: 2024-05-04 | End: 2024-05-06 | Stop reason: HOSPADM

## 2024-05-04 RX ORDER — SODIUM CHLORIDE 0.9 % (FLUSH) 0.9 %
10 SYRINGE (ML) INJECTION PRN
Status: DISCONTINUED | OUTPATIENT
Start: 2024-05-04 | End: 2024-05-06 | Stop reason: HOSPADM

## 2024-05-04 RX ORDER — NITROGLYCERIN 0.4 MG/1
0.4 TABLET SUBLINGUAL EVERY 5 MIN PRN
Status: DISCONTINUED | OUTPATIENT
Start: 2024-05-04 | End: 2024-05-06 | Stop reason: HOSPADM

## 2024-05-04 RX ORDER — SODIUM CHLORIDE 0.9 % (FLUSH) 0.9 %
5-40 SYRINGE (ML) INJECTION EVERY 12 HOURS SCHEDULED
Status: DISCONTINUED | OUTPATIENT
Start: 2024-05-04 | End: 2024-05-06 | Stop reason: HOSPADM

## 2024-05-04 RX ORDER — POTASSIUM CHLORIDE 20 MEQ/1
40 TABLET, EXTENDED RELEASE ORAL PRN
Status: DISCONTINUED | OUTPATIENT
Start: 2024-05-04 | End: 2024-05-06 | Stop reason: HOSPADM

## 2024-05-04 RX ORDER — SODIUM CHLORIDE 9 MG/ML
INJECTION, SOLUTION INTRAVENOUS PRN
Status: DISCONTINUED | OUTPATIENT
Start: 2024-05-04 | End: 2024-05-06 | Stop reason: HOSPADM

## 2024-05-04 RX ORDER — MAGNESIUM SULFATE IN WATER 40 MG/ML
2000 INJECTION, SOLUTION INTRAVENOUS ONCE
Status: COMPLETED | OUTPATIENT
Start: 2024-05-04 | End: 2024-05-04

## 2024-05-04 RX ADMIN — POTASSIUM CHLORIDE 40 MEQ: 1500 TABLET, EXTENDED RELEASE ORAL at 09:54

## 2024-05-04 RX ADMIN — PROCAINAMIDE HYDROCHLORIDE 2 MG/MIN: 500 INJECTION INTRAMUSCULAR; INTRAVENOUS at 18:52

## 2024-05-04 RX ADMIN — SODIUM CHLORIDE, PRESERVATIVE FREE 10 ML: 5 INJECTION INTRAVENOUS at 21:46

## 2024-05-04 RX ADMIN — MAGNESIUM SULFATE HEPTAHYDRATE 2000 MG: 40 INJECTION, SOLUTION INTRAVENOUS at 09:59

## 2024-05-04 RX ADMIN — PROCAINAMIDE HYDROCHLORIDE 2 MG/MIN: 500 INJECTION INTRAMUSCULAR; INTRAVENOUS at 00:59

## 2024-05-04 RX ADMIN — VERAPAMIL HYDROCHLORIDE 120 MG: 120 TABLET, FILM COATED, EXTENDED RELEASE ORAL at 14:31

## 2024-05-04 RX ADMIN — SODIUM CHLORIDE, PRESERVATIVE FREE 10 ML: 5 INJECTION INTRAVENOUS at 08:40

## 2024-05-04 NOTE — ED PROVIDER NOTES
[facility]     Emergency Department     Faculty Attestation    I performed a history and physical examination of the patient and discussed management with the resident. I reviewed the resident’s note and agree with the documented findings and plan of care. Any areas of disagreement are noted on the chart. I was personally present for the key portions of any procedures. I have documented in the chart those procedures where I was not present during the key portions. I have reviewed the emergency nurses triage note. I agree with the chief complaint, past medical history, past surgical history, allergies, medications, social and family history as documented unless otherwise noted below.    For Physician Assistant/ Nurse Practitioner cases/documentation I have personally evaluated this patient and have completed at least one if not all key elements of the E/M (history, physical exam, and MDM). Additional findings are as noted.      Primary Care Physician:  No primary care provider on file.    CHIEF COMPLAINT       Chief Complaint   Patient presents with    Chest Pain       RECENT VITALS:   Temp: 97.9 °F (36.6 °C),  Pulse: (!) 112, Respirations: 13, BP: (!) 117/94    LABS:  Labs Reviewed - No data to display    Radiology  No orders to display         Attending Physician Additional  Notes    The patient is a 38-year-old male with history of hypertrophic cardiomyopathy, V. tach, cocaine abuse, and noncompliance who presents for evaluation of chest pressure.  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  He was previously seen by pediatric cardiology in [place] and then transition to a cardiologist at [facility] before transitioning to Dr. Anaya at [facility].  The patient was previously put on a ZOLL LifeVest but he has not worn it for years.  He states that they have repeatedly told him that he needs an AICD but he has not gone 
of motion.      Comments: Patient with adequate bilateral upper and lower extremity motor function without acute deficits or deviation from patient's baseline.     Skin:     General: Skin is warm and dry.      Capillary Refill: Capillary refill takes less than 2 seconds.      Findings: No rash.   Neurological:      Mental Status: He is alert and oriented to person, place, and time.      Comments: Patient with adequate motor, and sensation to bilateral upper and lower distal extremities without acute deficits or deviation from patient's baseline.             DDX/DIAGNOSTIC RESULTS / EMERGENCY DEPARTMENT COURSE / MDM     Medical Decision Making  This is a 38-year-old male with HOCM with ventricular tachycardia in the field now converted to sinus tachycardia after procainamide.  Will consult cardiology for further management recommendations and obtain cardiac workup.    Amount and/or Complexity of Data Reviewed  Labs: ordered. Decision-making details documented in ED Course.  Radiology: ordered. Decision-making details documented in ED Course.  ECG/medicine tests: ordered. Decision-making details documented in ED Course.        EKG  EKG Interpretation    Interpreted by emergency department physician    Rhythm: sinus tachycardia  Rate: 120-130  Axis: right  Ectopy: premature ventricular contractions (infrequent)  Conduction: normal  ST Segments: no acute change  T Waves: no acute change  Q Waves: none    Clinical Impression: sinus tachycardia    Farooq Crews MD      All EKG's are interpreted by the Emergency Department Physician who either signs or Co-signs this chart in the absence of a cardiologist.    EMERGENCY DEPARTMENT COURSE:      ED Course as of 05/04/24 0122   Sat May 04, 2024   0028 Spoke to cardiology who recommend continuing procainamide until evaluated in the morning.  Consulting [facility] for admission. [KM]   0048 CMP(!):    Sodium 137   Potassium 3.8   Chloride 101   CARBON DIOXIDE 22   Anion Gap

## 2024-05-04 NOTE — ED TRIAGE NOTES
Pt arriving to ed 15 via ems by LS2  Pt stated they had chest pain/tightness started at about 45 min pta  Pt was getting off work when they and has been under a lot of stress with family  Upon arrival ems saw vtach on their monitor, procainamide was given and pt symptoms following  Pt is sinus tachycardia on arrival    Pt states they have been out of medication for a few weeks now but normally does follow up [facility] cardiology for hypertropic cardiomyopathy   Supposed to have life vest but has not been able to obtain one at this time   Pt states they have been having occasional syncopal episodes for past couple weeks   Pt denying any symptoms right now  Pt is resting on stretcher with call light within reach.  Breathing is non labored and no acute distress is noted.   Will continue to follow plan of care   Pt placed on continuous cardiac monitoring, BP, Pulse ox. EKG obtained. IV established and labs drawn.   Pt hooked up to [facility]  
Universal Safety Interventions

## 2024-05-04 NOTE — ED NOTES
The following labs were labeled with appropriate pt sticker and tubed to lab:     [x] Blue     [x] Lavender   [] on ice  [x] Green/yellow  [x] Green/black [] on ice  [] Grey  [] on ice  [] Yellow  [] Red  [] Pink  [] Type/ Screen  [] ABG  [] VBG    [] COVID-19 swab    [] Rapid  [] PCR  [] Flu swab  [] Peds Viral Panel     [] Urine Sample  [] Fecal Sample  [] Pelvic Cultures  [] Blood Cultures  [] X 2  [] STREP Cultures
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
## 2024-05-04 NOTE — DISCHARGE INSTR - COC
pain score (0-10 scale):    Last Weight:   Wt Readings from Last 1 Encounters:   24 107.5 kg (237 lb)     Mental Status:  {IP PT MENTAL STATUS:85874}    IV Access:  { DENITA IV ACCESS:513341135}    Nursing Mobility/ADLs:  Walking   {CHP DME ADLs:289273859}  Transfer  {CHP DME ADLs:391853991}  Bathing  {CHP DME ADLs:351113076}  Dressing  {CHP DME ADLs:538138422}  Toileting  {CHP DME ADLs:751295110}  Feeding  {CHP DME ADLs:614624444}  Med Admin  {CHP DME ADLs:609166517}  Med Delivery   { DENITA MED Delivery:963610883}    Wound Care Documentation and Therapy:        Elimination:  Continence:   Bowel: {YES / NO:}  Bladder: {YES / NO:}  Urinary Catheter: {Urinary Catheter:838615763}   Colostomy/Ileostomy/Ileal Conduit: {YES / NO:}       Date of Last BM: ***  No intake or output data in the 24 hours ending 24 2354  No intake/output data recorded.    Safety Concerns:     { DENITA Safety Concerns:267881042}    Impairments/Disabilities:      { DENITA Impairments/Disabilities:575131659}    Nutrition Therapy:  Current Nutrition Therapy:   { DENITA Diet List:875224218}    Routes of Feeding: {CHP DME Other Feedings:572088621}  Liquids: {Slp liquid thickness:99609}  Daily Fluid Restriction: {CHP DME Yes amt example:707126975}  Last Modified Barium Swallow with Video (Video Swallowing Test): {Done Not Done Date:}    Treatments at the Time of Hospital Discharge:   Respiratory Treatments: ***  Oxygen Therapy:  {Therapy; copd oxygen:79093}  Ventilator:    { CC Vent List:373491710}    Rehab Therapies: {THERAPEUTIC INTERVENTION:9962703534}  Weight Bearing Status/Restrictions: {[facility] Weight Bearin}  Other Medical Equipment (for information only, NOT a DME order):  {EQUIPMENT:857620251}  Other Treatments: ***    Patient's personal belongings (please select all that are sent with patient):  {P DME Belongings:254920011}    RN SIGNATURE:  {Esignature:227431255}    CASE MANAGEMENT/SOCIAL WORK

## 2024-05-04 NOTE — CARE COORDINATION
Case Management Assessment  Initial Evaluation    Date/Time of Evaluation: 5/4/2024 1:15 PM  Assessment Completed by: Lupe Morse RN    If patient is discharged prior to next notation, then this note serves as note for discharge by case management.    Patient Name: Jaswant Calabrese                   YOB: 1985  Diagnosis: HOCM (hypertrophic obstructive cardiomyopathy) (HCC) [I42.1]  Tachycardia [R00.0]                   Date / Time: 5/3/2024 11:37 PM    Patient Admission Status: Inpatient   Readmission Risk (Low < 19, Mod (19-27), High > 27): Readmission Risk Score: 8.8    Current PCP: No primary care provider on file.  PCP verified by CM? (P) No (pt does not have PCP) list provided    Chart Reviewed: Yes      History Provided by: (P) Patient  Patient Orientation: Alert and Oriented    Patient Cognition: (P) Alert    Hospitalization in the last 30 days (Readmission):  No    If yes, Readmission Assessment in CM Navigator will be completed.    Advance Directives:      Code Status: Full Code   Patient's Primary Decision Maker is: (P) Legal Next of Kin      Discharge Planning:    Patient lives with: (P) Children Type of Home: (P) House  Primary Care Giver: (P) Self  Patient Support Systems include: (P) Children, Family Members, Friends/Neighbors   Current Financial resources:    Current community resources:    Current services prior to admission: (P) None            Current DME:              Type of Home Care services:  (P) None    ADLS  Prior functional level: (P) Independent in ADLs/IADLs  Current functional level: (P) Independent in ADLs/IADLs    PT AM-PAC:   /24  OT AM-PAC:   /24    Family can provide assistance at DC: (P) Yes  Would you like Case Management to discuss the discharge plan with any other family members/significant others, and if so, who? (P) No  Plans to Return to Present Housing: (P) Yes  Other Identified Issues/Barriers to RETURNING to current housing: none  Potential Assistance

## 2024-05-04 NOTE — ED NOTES
ED to inpatient nurses report      Chief Complaint:  Chief Complaint   Patient presents with    Chest Pain     Present to ED from: home    MOA:     LOC: alert and orientated to name, place, date  Mobility: Independent  Oxygen Baseline: RA    Current needs required: 2L O2 nc for comfort    Pending ED orders: admit  Present condition: resting in ed cot, procainamide cont infusion     Why did the patient come to the ED? Chest tightness, showing vtach on ems monitor, given procainamide in field, showing sinus tach on arrival   What is the plan? Admit for cards to follow up, procainamide gtt  Any procedures or intervention occur? Line, labs, EKG, chest xray, procainamide     Mental Status:  Level of Consciousness: Alert (0)    Psych Assessment:   Psychosocial  Psychosocial (WDL): Within Defined Limits  Vital signs   Vitals:    05/03/24 2339 05/03/24 2344   BP: (!) 117/94    Pulse: (!) 130 (!) 112   Resp: 16 13   Temp: 97.9 °F (36.6 °C)    TempSrc: Oral    SpO2: 95% 93%   Weight: 107.5 kg (237 lb)    Height: 1.956 m (6' 5\")         Vitals:  Patient Vitals for the past 24 hrs:   BP Temp Temp src Pulse Resp SpO2 Height Weight   05/03/24 2344 -- -- -- (!) 112 13 93 % -- --   05/03/24 2339 (!) 117/94 97.9 °F (36.6 °C) Oral (!) 130 16 95 % 1.956 m (6' 5\") 107.5 kg (237 lb)      Visit Vitals  BP (!) 117/94   Pulse (!) 112   Temp 97.9 °F (36.6 °C) (Oral)   Resp 13   Ht 1.956 m (6' 5\")   Wt 107.5 kg (237 lb)   SpO2 93%   BMI 28.10 kg/m²        LDAs:   Peripheral IV 05/03/24 Distal;Left;Anterior Cephalic (Active)       Peripheral IV 05/03/24 Right Antecubital (Active)       Ambulatory Status:  Presents to emergency department  because of falls (Syncope, seizure, or loss of consciousness): No, Age > 70: No, Altered Mental Status, Intoxication with alcohol or substance confusion (Disorientation, impaired judgment, poor safety awaremess, or inability to follow instructions): No, Impaired Mobility: Ambulates or transfers with

## 2024-05-05 LAB
ANION GAP SERPL CALCULATED.3IONS-SCNC: 9 MMOL/L (ref 9–16)
BUN SERPL-MCNC: 10 MG/DL (ref 6–20)
CALCIUM SERPL-MCNC: 8.8 MG/DL (ref 8.6–10.4)
CHLORIDE SERPL-SCNC: 106 MMOL/L (ref 98–107)
CHOLEST SERPL-MCNC: 133 MG/DL (ref 0–199)
CHOLESTEROL/HDL RATIO: 3
CO2 SERPL-SCNC: 23 MMOL/L (ref 20–31)
CREAT SERPL-MCNC: 1.3 MG/DL (ref 0.7–1.2)
ERYTHROCYTE [DISTWIDTH] IN BLOOD BY AUTOMATED COUNT: 13.3 % (ref 11.8–14.4)
GFR, ESTIMATED: 73 ML/MIN/1.73M2
GLUCOSE SERPL-MCNC: 104 MG/DL (ref 74–99)
HCT VFR BLD AUTO: 50 % (ref 40.7–50.3)
HDLC SERPL-MCNC: 40 MG/DL
HGB BLD-MCNC: 16.2 G/DL (ref 13–17)
INR PPP: 1.1
LDLC SERPL CALC-MCNC: 78 MG/DL (ref 0–100)
MAGNESIUM SERPL-MCNC: 2.1 MG/DL (ref 1.6–2.6)
MCH RBC QN AUTO: 30.2 PG (ref 25.2–33.5)
MCHC RBC AUTO-ENTMCNC: 32.4 G/DL (ref 28.4–34.8)
MCV RBC AUTO: 93.3 FL (ref 82.6–102.9)
NRBC BLD-RTO: 0 PER 100 WBC
PLATELET # BLD AUTO: 230 K/UL (ref 138–453)
PMV BLD AUTO: 11.7 FL (ref 8.1–13.5)
POTASSIUM SERPL-SCNC: 4.1 MMOL/L (ref 3.7–5.3)
PROTHROMBIN TIME: 13.6 SEC (ref 11.7–14.9)
RBC # BLD AUTO: 5.36 M/UL (ref 4.21–5.77)
SODIUM SERPL-SCNC: 138 MMOL/L (ref 136–145)
TRIGL SERPL-MCNC: 72 MG/DL
VLDLC SERPL CALC-MCNC: 14 MG/DL
WBC OTHER # BLD: 5.9 K/UL (ref 3.5–11.3)

## 2024-05-05 PROCEDURE — 83735 ASSAY OF MAGNESIUM: CPT

## 2024-05-05 PROCEDURE — 80061 LIPID PANEL: CPT

## 2024-05-05 PROCEDURE — 80048 BASIC METABOLIC PNL TOTAL CA: CPT

## 2024-05-05 PROCEDURE — 99233 SBSQ HOSP IP/OBS HIGH 50: CPT | Performed by: NURSE PRACTITIONER

## 2024-05-05 PROCEDURE — 2060000000 HC ICU INTERMEDIATE R&B

## 2024-05-05 PROCEDURE — 6370000000 HC RX 637 (ALT 250 FOR IP): Performed by: INTERNAL MEDICINE

## 2024-05-05 PROCEDURE — 85610 PROTHROMBIN TIME: CPT

## 2024-05-05 PROCEDURE — 99232 SBSQ HOSP IP/OBS MODERATE 35: CPT | Performed by: STUDENT IN AN ORGANIZED HEALTH CARE EDUCATION/TRAINING PROGRAM

## 2024-05-05 PROCEDURE — 85027 COMPLETE CBC AUTOMATED: CPT

## 2024-05-05 PROCEDURE — 2580000003 HC RX 258: Performed by: NURSE PRACTITIONER

## 2024-05-05 PROCEDURE — 36415 COLL VENOUS BLD VENIPUNCTURE: CPT

## 2024-05-05 RX ORDER — ALBUTEROL SULFATE 90 UG/1
1-2 AEROSOL, METERED RESPIRATORY (INHALATION) EVERY 4 HOURS PRN
Qty: 1 EACH | Refills: 1 | Status: SHIPPED | OUTPATIENT
Start: 2024-05-05

## 2024-05-05 RX ORDER — ASPIRIN 81 MG/1
81 TABLET, CHEWABLE ORAL DAILY
Qty: 30 TABLET | Refills: 0 | Status: SHIPPED | OUTPATIENT
Start: 2024-05-05 | End: 2024-06-04

## 2024-05-05 RX ADMIN — SODIUM CHLORIDE, PRESERVATIVE FREE 10 ML: 5 INJECTION INTRAVENOUS at 20:59

## 2024-05-05 RX ADMIN — SODIUM CHLORIDE, PRESERVATIVE FREE 10 ML: 5 INJECTION INTRAVENOUS at 07:48

## 2024-05-05 RX ADMIN — VERAPAMIL HYDROCHLORIDE 120 MG: 120 TABLET, FILM COATED, EXTENDED RELEASE ORAL at 07:48

## 2024-05-05 RX ADMIN — VERAPAMIL HYDROCHLORIDE 120 MG: 120 TABLET, FILM COATED, EXTENDED RELEASE ORAL at 20:59

## 2024-05-05 NOTE — CARE COORDINATION
Met with patient to discuss wearable cardiac defibrillator. He previously had a vest from [employer] and lost the equipment. He is agreeable to use [employer] or [employer] for a new vest. Spoke to Eliezer from [employer]. They are not able to assist d/t equipment not returned. Voicemail left for Jesse from [facility] requesting return call    4522 spoke to Jesse. He recommends sending progress notes. Last echo was from 7/2023. Order and clinicals faxed to [facility]    3548 received call from Jesse requesting more information. EKG, echo from 7/2023, and rhythm strip showing v tach faxed
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
## 2024-05-06 VITALS
OXYGEN SATURATION: 95 % | WEIGHT: 248.02 LBS | SYSTOLIC BLOOD PRESSURE: 151 MMHG | RESPIRATION RATE: 16 BRPM | HEART RATE: 82 BPM | BODY MASS INDEX: 28.7 KG/M2 | TEMPERATURE: 98.1 F | DIASTOLIC BLOOD PRESSURE: 91 MMHG | HEIGHT: 78 IN

## 2024-05-06 PROCEDURE — 99232 SBSQ HOSP IP/OBS MODERATE 35: CPT | Performed by: NURSE PRACTITIONER

## 2024-05-06 PROCEDURE — 99233 SBSQ HOSP IP/OBS HIGH 50: CPT

## 2024-05-06 PROCEDURE — 2580000003 HC RX 258: Performed by: NURSE PRACTITIONER

## 2024-05-06 PROCEDURE — 6370000000 HC RX 637 (ALT 250 FOR IP): Performed by: INTERNAL MEDICINE

## 2024-05-06 RX ADMIN — VERAPAMIL HYDROCHLORIDE 120 MG: 120 TABLET, FILM COATED, EXTENDED RELEASE ORAL at 08:21

## 2024-05-06 RX ADMIN — SODIUM CHLORIDE, PRESERVATIVE FREE 10 ML: 5 INJECTION INTRAVENOUS at 08:21

## 2024-05-06 NOTE — DISCHARGE SUMMARY
amiodarone 400 MG tablet  Commonly known as: PACERONE     lisinopril 5 MG tablet  Commonly known as: PRINIVIL;ZESTRIL               Where to Get Your Medications        These medications were sent to [facility] - [place], [place] - [address] - P [phone] - F [phone]  [facility] [address]      Phone: [phone]   albuterol sulfate  (90 Base) MCG/ACT inhaler  aspirin 81 MG chewable tablet  verapamil 120 MG extended release tablet         No discharge procedures on file.    Time Spent on discharge is  31 mins in patient examination, evaluation, counseling as well as medication reconciliation, prescriptions for required medications, discharge plan and follow up.    Electronically signed by   Eduardo Flynn MD  5/6/2024  6:00 PM

## 2024-05-06 NOTE — PLAN OF CARE
Problem: Discharge Planning  Goal: Discharge to home or other facility with appropriate resources  Outcome: Progressing  Flowsheets (Taken 5/4/2024 2000)  Discharge to home or other facility with appropriate resources:   Identify barriers to discharge with patient and caregiver   Arrange for needed discharge resources and transportation as appropriate   Identify discharge learning needs (meds, wound care, etc)   Refer to discharge planning if patient needs post-hospital services based on physician order or complex needs related to functional status, cognitive ability or social support system     Problem: Respiratory - Adult  Goal: Achieves optimal ventilation and oxygenation  Outcome: Progressing  Flowsheets (Taken 5/4/2024 2000)  Achieves optimal ventilation and oxygenation:   Assess for changes in respiratory status   Assess for changes in mentation and behavior   Position to facilitate oxygenation and minimize respiratory effort   Encourage broncho-pulmonary hygiene including cough, deep breathe, incentive spirometry     Problem: Cardiovascular - Adult  Goal: Maintains optimal cardiac output and hemodynamic stability  Outcome: Progressing  Flowsheets (Taken 5/4/2024 2000)  Maintains optimal cardiac output and hemodynamic stability:   Monitor blood pressure and heart rate   Monitor urine output and notify Licensed Independent Practitioner for values outside of normal range   Assess for signs of decreased cardiac output     Problem: Metabolic/Fluid and Electrolytes - Adult  Goal: Electrolytes maintained within normal limits  Outcome: Progressing  Flowsheets (Taken 5/4/2024 2000)  Electrolytes maintained within normal limits:   Monitor labs and assess patient for signs and symptoms of electrolyte imbalances   Administer electrolyte replacement as ordered   Monitor response to electrolyte replacements, including repeat lab results as appropriate     Problem: Safety - Adult  Goal: Free from fall injury  Outcome: 
  Problem: Discharge Planning  Goal: Discharge to home or other facility with appropriate resources  Outcome: Progressing  Flowsheets (Taken 5/5/2024 2000)  Discharge to home or other facility with appropriate resources:   Identify barriers to discharge with patient and caregiver   Arrange for needed discharge resources and transportation as appropriate   Identify discharge learning needs (meds, wound care, etc)     Problem: Respiratory - Adult  Goal: Achieves optimal ventilation and oxygenation  Outcome: Progressing     Problem: Cardiovascular - Adult  Goal: Maintains optimal cardiac output and hemodynamic stability  Outcome: Progressing  Flowsheets (Taken 5/5/2024 2000)  Maintains optimal cardiac output and hemodynamic stability:   Monitor blood pressure and heart rate   Monitor urine output and notify Licensed Independent Practitioner for values outside of normal range   Assess for signs of decreased cardiac output  Goal: Absence of cardiac dysrhythmias or at baseline  Outcome: Progressing  Flowsheets (Taken 5/5/2024 2000)  Absence of cardiac dysrhythmias or at baseline:   Monitor cardiac rate and rhythm   Assess for signs of decreased cardiac output   Administer antiarrhythmia medication and electrolyte replacement as ordered     Problem: Metabolic/Fluid and Electrolytes - Adult  Goal: Electrolytes maintained within normal limits  Outcome: Progressing  Flowsheets (Taken 5/5/2024 2000)  Electrolytes maintained within normal limits:   Monitor labs and assess patient for signs and symptoms of electrolyte imbalances   Administer electrolyte replacement as ordered   Monitor response to electrolyte replacements, including repeat lab results as appropriate  Goal: Hemodynamic stability and optimal renal function maintained  Outcome: Progressing  Flowsheets (Taken 5/5/2024 2000)  Hemodynamic stability and optimal renal function maintained:   Monitor labs and assess for signs and symptoms of volume excess or deficit   
Presents from the ER in need of a cardiology consult due to troponin levels trending upward, the presence of nonsustained vtach and a prolonged QT.     Problem: Discharge Planning  Goal: Discharge to home or other facility with appropriate resources  Outcome: Progressing  Flowsheets (Taken 5/4/2024 0236)  Discharge to home or other facility with appropriate resources:   Identify barriers to discharge with patient and caregiver   Arrange for needed discharge resources and transportation as appropriate   Identify discharge learning needs (meds, wound care, etc)   Refer to discharge planning if patient needs post-hospital services based on physician order or complex needs related to functional status, cognitive ability or social support system     Problem: Respiratory - Adult  Goal: Achieves optimal ventilation and oxygenation  Outcome: Progressing  Flowsheets (Taken 5/4/2024 0236)  Achieves optimal ventilation and oxygenation:   Assess for changes in respiratory status   Assess for changes in mentation and behavior   Position to facilitate oxygenation and minimize respiratory effort   Initiate smoking cessation protocol as indicated   Encourage broncho-pulmonary hygiene including cough, deep breathe, incentive spirometry     Problem: Cardiovascular - Adult  Goal: Maintains optimal cardiac output and hemodynamic stability  Outcome: Progressing  Flowsheets (Taken 5/4/2024 0236)  Maintains optimal cardiac output and hemodynamic stability:   Monitor blood pressure and heart rate   Monitor urine output and notify Licensed Independent Practitioner for values outside of normal range   Assess for signs of decreased cardiac output  Goal: Absence of cardiac dysrhythmias or at baseline  Outcome: Progressing  Flowsheets (Taken 5/4/2024 0236)  Absence of cardiac dysrhythmias or at baseline:   Monitor cardiac rate and rhythm   Assess for signs of decreased cardiac output   Administer antiarrhythmia medication and electrolyte 
JACQUELIN Snow  Outcome: Progressing  5/6/2024 0223 by Lisa Ramirez RN  Outcome: Progressing     Problem: Drug Abuse/Detox  Goal: Will have no detox symptoms and will verbalize plan for changing drug-related behavior  Description: INTERVENTIONS:  1. Administer medication as ordered  2. Monitor physical status  3. Provide emotional support with 1:1 interaction with staff  4. Encourage  recovery focused treatment   5/6/2024 0735 by Gwendolyn Schneider RN  Outcome: Progressing  5/6/2024 0223 by Lisa Ramirez RN  Outcome: Progressing

## 2024-05-06 NOTE — CARE COORDINATION
Transitional Planning  Received vm from Jesse with [employer]  Pt's echo must be within the past 3 mths for indications on DME.  They can process DME lifevest for long QT interval but will need a new DME order  PS Josie Silver NP with Internal Medicine  Await response      10:11  cAlled Jesse Barbour [phone] mesesage left needing fax number to fac new order await call back
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
## 2024-05-07 LAB
EKG ATRIAL RATE: 74 BPM
EKG P AXIS: 73 DEGREES
EKG P-R INTERVAL: 168 MS
EKG Q-T INTERVAL: 472 MS
EKG QRS DURATION: 102 MS
EKG QTC CALCULATION (BAZETT): 523 MS
EKG R AXIS: 4 DEGREES
EKG T AXIS: 79 DEGREES
EKG VENTRICULAR RATE: 74 BPM

## 2024-05-07 PROCEDURE — 93010 ELECTROCARDIOGRAM REPORT: CPT | Performed by: INTERNAL MEDICINE

## 2024-05-24 ENCOUNTER — OFFICE VISIT (OUTPATIENT)
Dept: INTERNAL MEDICINE | Age: 39
End: 2024-05-24
Payer: COMMERCIAL

## 2024-05-24 VITALS
HEIGHT: 78 IN | BODY MASS INDEX: 30.85 KG/M2 | SYSTOLIC BLOOD PRESSURE: 124 MMHG | HEART RATE: 87 BPM | TEMPERATURE: 97.3 F | WEIGHT: 266.6 LBS | OXYGEN SATURATION: 95 % | DIASTOLIC BLOOD PRESSURE: 74 MMHG

## 2024-05-24 DIAGNOSIS — F14.10 COCAINE ABUSE (HCC): ICD-10-CM

## 2024-05-24 DIAGNOSIS — B35.1 ONYCHOMYCOSIS: ICD-10-CM

## 2024-05-24 DIAGNOSIS — J45.20 MILD INTERMITTENT ASTHMA WITHOUT COMPLICATION: Primary | ICD-10-CM

## 2024-05-24 DIAGNOSIS — R03.0 ELEVATED BLOOD PRESSURE READING WITHOUT DIAGNOSIS OF HYPERTENSION: ICD-10-CM

## 2024-05-24 PROBLEM — R00.0 TACHYCARDIA: Status: RESOLVED | Noted: 2023-07-14 | Resolved: 2024-05-24

## 2024-05-24 PROCEDURE — 99203 OFFICE O/P NEW LOW 30 MIN: CPT

## 2024-05-24 SDOH — ECONOMIC STABILITY: HOUSING INSECURITY
IN THE LAST 12 MONTHS, WAS THERE A TIME WHEN YOU DID NOT HAVE A STEADY PLACE TO SLEEP OR SLEPT IN A SHELTER (INCLUDING NOW)?: YES

## 2024-05-24 SDOH — ECONOMIC STABILITY: INCOME INSECURITY: HOW HARD IS IT FOR YOU TO PAY FOR THE VERY BASICS LIKE FOOD, HOUSING, MEDICAL CARE, AND HEATING?: NOT HARD AT ALL

## 2024-05-24 SDOH — ECONOMIC STABILITY: FOOD INSECURITY: WITHIN THE PAST 12 MONTHS, THE FOOD YOU BOUGHT JUST DIDN'T LAST AND YOU DIDN'T HAVE MONEY TO GET MORE.: OFTEN TRUE

## 2024-05-24 SDOH — HEALTH STABILITY: PHYSICAL HEALTH: ON AVERAGE, HOW MANY MINUTES DO YOU ENGAGE IN EXERCISE AT THIS LEVEL?: 120 MIN

## 2024-05-24 SDOH — ECONOMIC STABILITY: FOOD INSECURITY: WITHIN THE PAST 12 MONTHS, YOU WORRIED THAT YOUR FOOD WOULD RUN OUT BEFORE YOU GOT MONEY TO BUY MORE.: OFTEN TRUE

## 2024-05-24 SDOH — HEALTH STABILITY: PHYSICAL HEALTH: ON AVERAGE, HOW MANY DAYS PER WEEK DO YOU ENGAGE IN MODERATE TO STRENUOUS EXERCISE (LIKE A BRISK WALK)?: 7 DAYS

## 2024-05-24 ASSESSMENT — PATIENT HEALTH QUESTIONNAIRE - PHQ9
2. FEELING DOWN, DEPRESSED OR HOPELESS: SEVERAL DAYS
SUM OF ALL RESPONSES TO PHQ QUESTIONS 1-9: 1
1. LITTLE INTEREST OR PLEASURE IN DOING THINGS: NOT AT ALL
SUM OF ALL RESPONSES TO PHQ QUESTIONS 1-9: 1
SUM OF ALL RESPONSES TO PHQ9 QUESTIONS 1 & 2: 1
SUM OF ALL RESPONSES TO PHQ QUESTIONS 1-9: 1
SUM OF ALL RESPONSES TO PHQ QUESTIONS 1-9: 1

## 2024-05-24 NOTE — PATIENT INSTRUCTIONS
[facility] Financial Resources*  (Call 211 if need more resources).     [facility] Veterans Service Commission:  What they offer:  Electric and gas payment services for veterans  Phone Number: ([phone] 119-2598 Service-Intake    [facility] Action Partnership (GLCAP):   [place], [place], [place], [place], [place]  counites  What they offer: Assistance with utility and housing expenses.  Phone Number: [phone]     [facility] Community Action Commission (NOCAC):   [place], [place], [place], and [place] counites  What they offer: Services for homelessness, housing, and home weatherization and repair.  Phone Number: [phone]    [employer]:   [facility]  What they offer: Heating Fuel Payment Assistance, Electric Service Payment Assistance and Water Service Payment Assistance  Phone Number: (609) 567-3093 ext: x11 Service-Intake    [employer]:  What they offer: Heat, Electric, Gas and water payment services.  Phone Number: (449) 701-3797 Service-Intake    Community Action Commission of [place] Counites:  What they offer: Electric, gas and water payment service.  Phone: 550.825.8210        [place] Department of Job and Family Services (ODJSF):  What they offer: Government programs including Medicaid, SNAP(food stamps), TANF (cash assistance), and childcare assistance.  Phone Number: 826.889.7117    Area Office on Aging of [facility] ([facility]):  What they offer: Medical cab rides for seniors and referral to community resources  Phone Number: 104.686.5564     Area Agency on Aging, District 5:    [place], [place], [place], [place], [place], [place], [place], [place],  [facility]:  What they offer: Referral to transportation and other resources for seniors.  Phone Number: [phone]     Medications/Medical    [facility] Financial Assistance  What they offer: Assistance with [facility] bills  Phone: [phone];

## 2024-05-24 NOTE — PROGRESS NOTES
[facility]    New Patient Note/History and Physical    Date of patient's visit: 5/24/2024    Name:  Jaswant Calabrese      YOB: 1985    Patient Care Team:  Apolinar Tavera MD as PCP - General (Internal Medicine)    REASON FOR VISIT: First Visit, establish care     HISTORY OF PRESENTING ILLNESS:    History was obtained from the patient. Jaswant Calabrese is a 38 y.o. is here to establish care.     He has not had good follow-up previously.  He has known history of hypertrophic cardiomyopathy, substance use including cocaine, history of V. tach, asthma, charted history of hypertension, smoking.    Hypertrophic cardiomyopathy  History of wide-complex tachycardia  Last echocardiogram 7/2023 showing EF 55 to 60%, severe concentric LV hypertrophy, slightly increased LVOT velocity, grade 2 diastolic dysfunction.  Patient is supposed to wear external defibrillator and does have follow-up with cardiology, he will need to be 3 months clear of illicit substances before and ICD is placed.    Asthma  Patient has been out of his inhaled steroid inhaler and has been using albuterol instead, when he does use his inhaled steroid he does not require use of albuterol.  He denies any nighttime awakenings, his symptoms are slightly worse in recent weather given pollen, they are better in the bonilla.    Substance use disorder  Patient denies use of any cocaine or marijuana this time, he has been drug-free since before his last hospitalization.    Smoking  Patient uses about 9 to 10 cigarettes every day, is willing to quit    Patient is also due for HIV screening per the chart but he has had it done recently and declines at this time.    PAST MEDICAL AND SURGICAL HISTORY:          Diagnosis Date    DI (acute kidney injury) (HCC) 9/21/2015    Asthma     Cardiomyopathy (HCC)     Cocaine abuse (HCC)     Foreign body in ear 6/27/2014    Percocet pill    Heart attack (HCC) 2007    has

## 2024-05-24 NOTE — PROGRESS NOTES
Attending Physician Statement  I have discussed the care of Jaswant Calabrese, including pertinent history and exam findings with the resident. I have reviewed the key elements of all parts of the encounter with the resident. I agree with the assessment, and status of the problem list as documented and this was also documented by the resident. The medication list was reviewed with the resident and is up to date.   Diagnosis Orders   1. Mild intermittent asthma without complication  beclomethasone (QVAR REDIHALER) 40 MCG/ACT AERB inhaler      2. Elevated blood pressure reading without diagnosis of hypertension  Blood Pressure Monitoring (BLOOD PRESSURE MONITOR/S CUFF) MISC      3. Onychomycosis  [facility] [place]      4. Cocaine abuse (HCC)             Mayela Crawford MD   Attending Physician, [facility]   Faculty, Internal Medicine Residency Program  [facility]

## 2024-06-05 ENCOUNTER — OFFICE VISIT (OUTPATIENT)
Dept: PODIATRY | Age: 39
End: 2024-06-05
Payer: COMMERCIAL

## 2024-06-05 VITALS
WEIGHT: 266 LBS | HEIGHT: 78 IN | HEART RATE: 74 BPM | BODY MASS INDEX: 30.78 KG/M2 | DIASTOLIC BLOOD PRESSURE: 82 MMHG | SYSTOLIC BLOOD PRESSURE: 121 MMHG

## 2024-06-05 DIAGNOSIS — B35.1 ONYCHOMYCOSIS: Primary | ICD-10-CM

## 2024-06-05 DIAGNOSIS — M79.672 LEFT FOOT PAIN: ICD-10-CM

## 2024-06-05 PROCEDURE — 99203 OFFICE O/P NEW LOW 30 MIN: CPT | Performed by: PODIATRIST

## 2024-06-05 PROCEDURE — 99203 OFFICE O/P NEW LOW 30 MIN: CPT

## 2024-06-05 PROCEDURE — 11730 AVULSION NAIL PLATE SIMPLE 1: CPT

## 2024-06-05 RX ORDER — LIDOCAINE HYDROCHLORIDE 10 MG/ML
5 INJECTION, SOLUTION INFILTRATION; PERINEURAL ONCE
Status: COMPLETED | OUTPATIENT
Start: 2024-06-05 | End: 2024-06-05

## 2024-06-05 RX ADMIN — LIDOCAINE HYDROCHLORIDE 5 ML: 10 INJECTION, SOLUTION INFILTRATION; PERINEURAL at 15:20

## 2024-06-05 NOTE — PROGRESS NOTES
Patient instructed to remove shoes and socks and instructed to sit in exam chair.  Current PCP is Apolinar Tavera MD and date of last visit was 05/24/2024.   Do you have a follow up visit scheduled?  Yes  If yes, the date is 07/29/2024.    
38 y.o. male with     Diagnosis Orders   1. Onychomycosis        2. Left foot pain             Plan   A comprehensive history and physical examination were preformed. The patient was educated on clinical and radiographic findings, diagnosis and treatment plans. Patient state that he understands all that has been explained and all questions were answered to his apparent satisfaction.   Explained to patient the symptoms and causes of onychomycosis as well as injury to the nailbed.  Patient shows hypertrophy to the lateral border of the left third digit secondary to accident.  Patient also has evidence of onychomycosis to multiple toenails.  Reviewed in depth the difference between permanent and temporary nail avulsion.  Patient today states that he would like temporary nail avulsion and would be open to pursuing permanent avulsion down the line.  See procedural note below  Patient to RTC in 2 weeks   Please, call the office with any questions or concerns       Procedural note: Verbal consent was achieved from patient prior to procedure.  6 cc of 1% lidocaine plain was applied to the left third ray to anesthetize the digit.  A freer elevator was then placed underneath the third digit nail to release the nailbed from the underlying skin.  The nail was then avulsed using hemostat.  A curette was used to ensure no remaining particulate was present underneath the skin.  Surgical site was then irrigated and bulky dressing was applied.  Patient was then told he may remove dressing after 24 hours and may begin nightly soaks in Epsom salts.    No orders of the defined types were placed in this encounter.    No orders of the defined types were placed in this encounter.      Fozia Ho DPM   Podiatric Medicine & Surgery   6/5/2024 at 2:55 PM      I performed a history and physical examination of the patient and discussed management with the resident. I reviewed the resident’s note and agree with the documented findings

## 2024-06-19 ENCOUNTER — OFFICE VISIT (OUTPATIENT)
Dept: PODIATRY | Age: 39
End: 2024-06-19
Payer: COMMERCIAL

## 2024-06-19 VITALS
HEIGHT: 78 IN | BODY MASS INDEX: 30.78 KG/M2 | DIASTOLIC BLOOD PRESSURE: 68 MMHG | HEART RATE: 74 BPM | SYSTOLIC BLOOD PRESSURE: 142 MMHG | WEIGHT: 266 LBS

## 2024-06-19 DIAGNOSIS — B35.1 ONYCHOMYCOSIS: ICD-10-CM

## 2024-06-19 DIAGNOSIS — M79.672 LEFT FOOT PAIN: Primary | ICD-10-CM

## 2024-06-19 PROCEDURE — 99213 OFFICE O/P EST LOW 20 MIN: CPT | Performed by: PODIATRIST

## 2024-06-19 PROCEDURE — 99213 OFFICE O/P EST LOW 20 MIN: CPT

## 2024-06-21 NOTE — PROGRESS NOTES
Patient instructed to remove shoes and socks and instructed to sit in exam chair.  Current PCP is Apolinar Tavera MD and date of last visit was 05/24/2024.   Do you have a follow up visit scheduled?  Yes  If yes, the date is 07/29/2024.    
documented in the chart those procedures where I was not present during the key portions. I have reviewed the Podiatry Resident progress note. I agree with the chief complaint, past medical history, past surgical history, allergies, medications, social and family history as documented unless otherwise noted below. Documentation of the HPI, Physical Exam and Medical Decision Making performed by medical students or scribes is based on my personal performance of the HPI, PE and MDM. I have personally evaluated this patient and have completed at least one if not all key elements of the E/M (history, physical exam, and MDM). Additional findings are as noted.     Electronically signed by Lavelle Tipton DPM on 6/26/2024 at 1:10 PM

## 2024-06-25 ENCOUNTER — APPOINTMENT (OUTPATIENT)
Dept: GENERAL RADIOLOGY | Age: 39
End: 2024-06-25
Payer: COMMERCIAL

## 2024-06-25 ENCOUNTER — HOSPITAL ENCOUNTER (EMERGENCY)
Age: 39
Discharge: LEFT BEFORE DISCHARGE | End: 2024-06-25
Attending: EMERGENCY MEDICINE
Payer: COMMERCIAL

## 2024-06-25 VITALS
RESPIRATION RATE: 19 BRPM | OXYGEN SATURATION: 99 % | HEART RATE: 91 BPM | HEIGHT: 78 IN | BODY MASS INDEX: 30.79 KG/M2 | SYSTOLIC BLOOD PRESSURE: 141 MMHG | WEIGHT: 266.1 LBS | DIASTOLIC BLOOD PRESSURE: 90 MMHG

## 2024-06-25 DIAGNOSIS — I47.20 V-TACH (HCC): Primary | ICD-10-CM

## 2024-06-25 PROBLEM — I51.7 HYPERTROPHIC CARDIOMEGALY: Status: ACTIVE | Noted: 2024-06-25

## 2024-06-25 PROBLEM — I48.4 ATYPICAL ATRIAL FLUTTER (HCC): Status: ACTIVE | Noted: 2024-06-25

## 2024-06-25 PROBLEM — R00.0 WIDE-COMPLEX TACHYCARDIA: Status: ACTIVE | Noted: 2023-07-14

## 2024-06-25 LAB
ANION GAP SERPL CALCULATED.3IONS-SCNC: 15 MMOL/L (ref 9–16)
BASOPHILS # BLD: 0.06 K/UL (ref 0–0.2)
BASOPHILS NFR BLD: 1 % (ref 0–2)
BUN BLD-MCNC: 8 MG/DL (ref 8–26)
BUN SERPL-MCNC: 9 MG/DL (ref 6–20)
CA-I BLD-SCNC: 1.08 MMOL/L (ref 1.15–1.33)
CALCIUM SERPL-MCNC: 8.1 MG/DL (ref 8.6–10.4)
CHLORIDE BLD-SCNC: 107 MMOL/L (ref 98–107)
CHLORIDE SERPL-SCNC: 105 MMOL/L (ref 98–107)
CO2 BLD CALC-SCNC: 24 MMOL/L (ref 22–30)
CO2 SERPL-SCNC: 21 MMOL/L (ref 20–31)
CREAT SERPL-MCNC: 1.6 MG/DL (ref 0.7–1.2)
EGFR, POC: 56 ML/MIN/1.73M2
EOSINOPHIL # BLD: 0.07 K/UL (ref 0–0.44)
EOSINOPHILS RELATIVE PERCENT: 1 % (ref 1–4)
ERYTHROCYTE [DISTWIDTH] IN BLOOD BY AUTOMATED COUNT: 13.3 % (ref 11.8–14.4)
GFR, ESTIMATED: 56 ML/MIN/1.73M2
GLUCOSE BLD-MCNC: 180 MG/DL (ref 74–100)
GLUCOSE SERPL-MCNC: 180 MG/DL (ref 74–99)
HCO3 VENOUS: 23.8 MMOL/L (ref 22–29)
HCT VFR BLD AUTO: 46.2 % (ref 40.7–50.3)
HCT VFR BLD AUTO: 56 % (ref 41–53)
HGB BLD-MCNC: 15.2 G/DL (ref 13–17)
IMM GRANULOCYTES # BLD AUTO: 0.04 K/UL (ref 0–0.3)
IMM GRANULOCYTES NFR BLD: 1 %
INR PPP: 1.2
LYMPHOCYTES NFR BLD: 2.66 K/UL (ref 1.1–3.7)
LYMPHOCYTES RELATIVE PERCENT: 36 % (ref 24–43)
MCH RBC QN AUTO: 30 PG (ref 25.2–33.5)
MCHC RBC AUTO-ENTMCNC: 32.9 G/DL (ref 28.4–34.8)
MCV RBC AUTO: 91.1 FL (ref 82.6–102.9)
MONOCYTES NFR BLD: 0.51 K/UL (ref 0.1–1.2)
MONOCYTES NFR BLD: 7 % (ref 3–12)
NEGATIVE BASE EXCESS, VEN: 2.6 MMOL/L (ref 0–2)
NEUTROPHILS NFR BLD: 54 % (ref 36–65)
NEUTS SEG NFR BLD: 4.13 K/UL (ref 1.5–8.1)
NRBC BLD-RTO: 0 PER 100 WBC
O2 SAT, VEN: 59.3 % (ref 60–85)
PARTIAL THROMBOPLASTIN TIME: 27.3 SEC (ref 23–36.5)
PCO2 VENOUS: 45.3 MM HG (ref 41–51)
PH VENOUS: 7.33 (ref 7.32–7.43)
PLATELET # BLD AUTO: 222 K/UL (ref 138–453)
PMV BLD AUTO: 11.9 FL (ref 8.1–13.5)
PO2 VENOUS: 33.3 MM HG (ref 30–50)
POC ANION GAP: 10 MMOL/L (ref 7–16)
POC CREATININE: 1.6 MG/DL (ref 0.51–1.19)
POC HEMOGLOBIN (CALC): 19.2 G/DL (ref 13.5–17.5)
POC LACTIC ACID: 3.6 MMOL/L (ref 0.56–1.39)
POTASSIUM BLD-SCNC: 4.3 MMOL/L (ref 3.5–4.5)
POTASSIUM SERPL-SCNC: 3.3 MMOL/L (ref 3.7–5.3)
PROTHROMBIN TIME: 14.9 SEC (ref 11.7–14.9)
RBC # BLD AUTO: 5.07 M/UL (ref 4.21–5.77)
SODIUM BLD-SCNC: 140 MMOL/L (ref 138–146)
SODIUM SERPL-SCNC: 141 MMOL/L (ref 136–145)
TROPONIN I SERPL HS-MCNC: 43 NG/L (ref 0–22)
TROPONIN I SERPL HS-MCNC: 51 NG/L (ref 0–22)
WBC OTHER # BLD: 7.5 K/UL (ref 3.5–11.3)

## 2024-06-25 PROCEDURE — 80048 BASIC METABOLIC PNL TOTAL CA: CPT

## 2024-06-25 PROCEDURE — 84484 ASSAY OF TROPONIN QUANT: CPT

## 2024-06-25 PROCEDURE — 96366 THER/PROPH/DIAG IV INF ADDON: CPT

## 2024-06-25 PROCEDURE — 2500000003 HC RX 250 WO HCPCS

## 2024-06-25 PROCEDURE — 82330 ASSAY OF CALCIUM: CPT

## 2024-06-25 PROCEDURE — 80051 ELECTROLYTE PANEL: CPT

## 2024-06-25 PROCEDURE — 6360000002 HC RX W HCPCS: Performed by: STUDENT IN AN ORGANIZED HEALTH CARE EDUCATION/TRAINING PROGRAM

## 2024-06-25 PROCEDURE — 6360000002 HC RX W HCPCS

## 2024-06-25 PROCEDURE — 2580000003 HC RX 258

## 2024-06-25 PROCEDURE — 93005 ELECTROCARDIOGRAM TRACING: CPT

## 2024-06-25 PROCEDURE — 82565 ASSAY OF CREATININE: CPT

## 2024-06-25 PROCEDURE — 85730 THROMBOPLASTIN TIME PARTIAL: CPT

## 2024-06-25 PROCEDURE — 71045 X-RAY EXAM CHEST 1 VIEW: CPT

## 2024-06-25 PROCEDURE — 99223 1ST HOSP IP/OBS HIGH 75: CPT | Performed by: INTERNAL MEDICINE

## 2024-06-25 PROCEDURE — 96361 HYDRATE IV INFUSION ADD-ON: CPT

## 2024-06-25 PROCEDURE — 92960 CARDIOVERSION ELECTRIC EXT: CPT | Performed by: INTERNAL MEDICINE

## 2024-06-25 PROCEDURE — 85014 HEMATOCRIT: CPT

## 2024-06-25 PROCEDURE — 84520 ASSAY OF UREA NITROGEN: CPT

## 2024-06-25 PROCEDURE — 85610 PROTHROMBIN TIME: CPT

## 2024-06-25 PROCEDURE — 82803 BLOOD GASES ANY COMBINATION: CPT

## 2024-06-25 PROCEDURE — 99285 EMERGENCY DEPT VISIT HI MDM: CPT

## 2024-06-25 PROCEDURE — 96376 TX/PRO/DX INJ SAME DRUG ADON: CPT

## 2024-06-25 PROCEDURE — 96365 THER/PROPH/DIAG IV INF INIT: CPT

## 2024-06-25 PROCEDURE — 2500000003 HC RX 250 WO HCPCS: Performed by: EMERGENCY MEDICINE

## 2024-06-25 PROCEDURE — 85025 COMPLETE CBC W/AUTO DIFF WBC: CPT

## 2024-06-25 PROCEDURE — 92960 CARDIOVERSION ELECTRIC EXT: CPT

## 2024-06-25 PROCEDURE — 96368 THER/DIAG CONCURRENT INF: CPT

## 2024-06-25 PROCEDURE — 82947 ASSAY GLUCOSE BLOOD QUANT: CPT

## 2024-06-25 PROCEDURE — 83605 ASSAY OF LACTIC ACID: CPT

## 2024-06-25 PROCEDURE — 96375 TX/PRO/DX INJ NEW DRUG ADDON: CPT

## 2024-06-25 RX ORDER — AMIODARONE HYDROCHLORIDE 150 MG/3ML
150 INJECTION, SOLUTION INTRAVENOUS ONCE
Status: COMPLETED | OUTPATIENT
Start: 2024-06-25 | End: 2024-06-25

## 2024-06-25 RX ORDER — MIDAZOLAM HYDROCHLORIDE 5 MG/ML
INJECTION INTRAMUSCULAR; INTRAVENOUS
Status: DISCONTINUED
Start: 2024-06-25 | End: 2024-06-25 | Stop reason: HOSPADM

## 2024-06-25 RX ORDER — HEPARIN SODIUM 1000 [USP'U]/ML
2000 INJECTION, SOLUTION INTRAVENOUS; SUBCUTANEOUS PRN
Status: DISCONTINUED | OUTPATIENT
Start: 2024-06-25 | End: 2024-06-25 | Stop reason: HOSPADM

## 2024-06-25 RX ORDER — MIDAZOLAM HYDROCHLORIDE 2 MG/2ML
2 INJECTION, SOLUTION INTRAMUSCULAR; INTRAVENOUS ONCE
Status: COMPLETED | OUTPATIENT
Start: 2024-06-25 | End: 2024-06-25

## 2024-06-25 RX ORDER — 0.9 % SODIUM CHLORIDE 0.9 %
500 INTRAVENOUS SOLUTION INTRAVENOUS ONCE
Status: COMPLETED | OUTPATIENT
Start: 2024-06-25 | End: 2024-06-25

## 2024-06-25 RX ORDER — ETOMIDATE 2 MG/ML
10 INJECTION INTRAVENOUS ONCE
Status: COMPLETED | OUTPATIENT
Start: 2024-06-25 | End: 2024-06-25

## 2024-06-25 RX ORDER — HEPARIN SODIUM 1000 [USP'U]/ML
4000 INJECTION, SOLUTION INTRAVENOUS; SUBCUTANEOUS ONCE
Status: COMPLETED | OUTPATIENT
Start: 2024-06-25 | End: 2024-06-25

## 2024-06-25 RX ORDER — HEPARIN SODIUM 1000 [USP'U]/ML
4000 INJECTION, SOLUTION INTRAVENOUS; SUBCUTANEOUS PRN
Status: DISCONTINUED | OUTPATIENT
Start: 2024-06-25 | End: 2024-06-25 | Stop reason: HOSPADM

## 2024-06-25 RX ORDER — HEPARIN SODIUM 10000 [USP'U]/100ML
5-30 INJECTION, SOLUTION INTRAVENOUS CONTINUOUS
Status: DISCONTINUED | OUTPATIENT
Start: 2024-06-25 | End: 2024-06-25 | Stop reason: HOSPADM

## 2024-06-25 RX ADMIN — ETOMIDATE INJECTION 10 MG: 2 SOLUTION INTRAVENOUS at 16:02

## 2024-06-25 RX ADMIN — HEPARIN SODIUM 8 UNITS/KG/HR: 10000 INJECTION, SOLUTION INTRAVENOUS at 16:48

## 2024-06-25 RX ADMIN — SODIUM BICARBONATE 100 MEQ: 84 INJECTION INTRAVENOUS at 15:58

## 2024-06-25 RX ADMIN — AMIODARONE HYDROCHLORIDE 150 MG: 150 INJECTION, SOLUTION INTRAVENOUS at 15:30

## 2024-06-25 RX ADMIN — MIDAZOLAM HYDROCHLORIDE 2 MG: 1 INJECTION, SOLUTION INTRAMUSCULAR; INTRAVENOUS at 15:48

## 2024-06-25 RX ADMIN — HEPARIN SODIUM 4000 UNITS: 1000 INJECTION INTRAVENOUS; SUBCUTANEOUS at 16:47

## 2024-06-25 RX ADMIN — AMIODARONE HYDROCHLORIDE 150 MG: 150 INJECTION, SOLUTION INTRAVENOUS at 16:00

## 2024-06-25 RX ADMIN — Medication 1 MG/MIN: at 15:42

## 2024-06-25 RX ADMIN — SODIUM CHLORIDE 500 ML: 9 INJECTION, SOLUTION INTRAVENOUS at 16:24

## 2024-06-25 ASSESSMENT — LIFESTYLE VARIABLES
HOW OFTEN DO YOU HAVE A DRINK CONTAINING ALCOHOL: MONTHLY OR LESS
HOW MANY STANDARD DRINKS CONTAINING ALCOHOL DO YOU HAVE ON A TYPICAL DAY: 1 OR 2

## 2024-06-25 ASSESSMENT — ENCOUNTER SYMPTOMS
VOMITING: 0
NAUSEA: 0
SHORTNESS OF BREATH: 0

## 2024-06-25 NOTE — ED PROVIDER NOTES
[facility] ED  Emergency Department Encounter  Emergency Medicine Resident     Pt Name:Jaswant Calabrese  MRN: [id number]  Birthdate 1985  Date of evaluation: 6/25/24  PCP:  Apolinar Tavera MD  Note Started: 4:16 PM EDT      CHIEF COMPLAINT       Chief Complaint   Patient presents with    Chest Pain     V tach       HISTORY OF PRESENT ILLNESS  (Location/Symptom, Timing/Onset, Context/Setting, Quality, Duration, Modifying Factors, Severity.)      Jaswant Calabrese is a 38 y.o. male with history of hypertrophic cardiomyopathy with LifeVest who was brought in by EMS for V. tach with a pulse.  Patient has been awake and alert the whole time.  EMS did give procainamide prior to arrival with no improvement in his heart rate.  Patient states that his heart did go into this rhythm on 5/3 and he did receive amiodarone with improvement in his heart rate.  He states he was admitted for 3 days.  Patient does admit to using cocaine today.  He denies any other drugs.    PAST MEDICAL / SURGICAL / SOCIAL / FAMILY HISTORY      has a past medical history of DI (acute kidney injury) (HCC), Asthma, Cardiomyopathy (HCC), Cocaine abuse (HCC), Foreign body in ear, Heart attack (HCC), Hypertension, Hypokalemia, and Substance abuse (HCC).     has no past surgical history on file.    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 
drug management.  Decision regarding hospitalization.        Critical Care : This patient had a high probability of imminent or life-threatening deterioration due to unstable ventricular tachycardia, which required my direct attention, intervention, and personal management. I have personally provided 50 minutes of critical care time exclusive of time spent on separately billable procedures. Time includes:   review of laboratory data, radiology results, discussion with consultants, and monitoring for potential decompensation. Interventions were performed as documented above.    Glenis Alfredo MD   Attending Emergency Physician    (Please note that portions of this note were completed with a voice recognition program. Efforts were made to edit the dictations but occasionally words are mis-transcribed.)            Glenis Alfredo MD  06/25/24 1991       Glenis Alfredo MD  06/25/24 1815

## 2024-06-25 NOTE — ED NOTES
Pt explained the risks of leave AMA by Dr. Woodard. Writer witnessed Pt verbalizing he could go back into V-Tach at any moment and could potential die if he leaves the hospital AMA. Pt chose to leave AMA.
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
## 2024-06-25 NOTE — PROGRESS NOTES
[facility]  [place]ESS NOTE    Shift date: 6/25/2024  Shift day: Tuesday 06/25/24 1702   Encounter Summary   Encounter Overview/Reason Initial Encounter   Service Provided For Patient   Referral/Consult From Nurse   Support System Unknown   Last Encounter  06/25/24   Complexity of Encounter High   Begin Time 1620   End Time  1630   Total Time Calculated 10 min   Crisis   Type Trauma   Spiritual/Emotional needs   Type Emotional Distress   Assessment/Intervention/Outcome   Assessment Anxious;Concerns with suffering;Coping;Decisional conflict;Fearful;Moral distress;Stress overload   Intervention Active listening;Sustaining Presence/Ministry of presence   Outcome Acceptance;Coping;Expressed Gratitude;Expressed regrets   Plan and Referrals   Plan/Referrals Continue to visit, (comment)  (Continue to vists patient. Will be admitted for further testing.)        Shift # 2    Room # 13/13   Name: Jaswant Calabrese                Mosque: Orthodox   Place of Jain: N/A    Referral: Routine Visit    Admit Date & Time: 6/25/2024  3:20 PM    Assessment:  Jaswant Calabrese is a 38 y.o. male in the hospital because abnormal heart rate. Upon entering the room writer observes awake and alert. Patient was concerned about his condition.      Intervention:  Writer introduced self and title as  Kerri here to provide spiritual Writer offered space for patient  to express feelings, needs, and concerns and provided a ministry presence.     Outcome:  Patient will be admitted for further testing.    Plan:  Chaplains will remain available to offer spiritual and emotional support as needed.      Electronically signed by ADAM MCKEE, Intern, on 6/25/2024 at 5:06 PM.  [facility]  [phone]

## 2024-06-25 NOTE — PROCEDURES
[facility]  Cardioversion Procedure Note         Today's Date: 6/25/2024  Primary Cardiologist: Dr. Katia Nuñez (Attending Physician)  Indication: Unstable wide complex tachycardia    Patient seen and examined. History and Physical reviewed. Labs reviewed.    After informed consent was obtained with explanation of the risks and benefits, the patient was prepared using standard tecqniques.     All Conscious Sedation was administered via the Cardiologist.     CARDIOVERSION:  After an adequate level of sedation was achieved  200J in biphasic synchronized delivery was administered.   conversion to normal sinus rhythm.     The patient awoke without complications. A post procedure 12 L ECG was ordered and reviewed.    Impression:  Successful Consious Sedation - safely (Done by ER physician)  Successful Cardioversion into NSR.     Complications:  There were no complications encountered.      Alber Beltran MD.  Fellow, cardiovascular disease   [facility], [place], [place]    Keli Nuñez MD  [facility]

## 2024-06-25 NOTE — CONSULTS
edema or cyanosis   Neurological:  Deferred     LABS:   CBC:   Recent Labs     06/25/24  1549   WBC 7.5   HGB 15.2   HCT 46.2        BMP:   Recent Labs     06/25/24  1531   CREATININE 1.6*     BNP: No results for input(s): \"BNP\" in the last 72 hours.  PT/INR: No results for input(s): \"PROTIME\", \"INR\" in the last 72 hours.  APTT:No results for input(s): \"APTT\" in the last 72 hours.  CARDIAC ENZYMES:No results for input(s): \"CKTOTAL\", \"CKMB\", \"CKMBINDEX\", \"TROPONINI\" in the last 72 hours.    DATA:    EKG:   Wide-complex tachycardia    ECHO: 07/15/2023  Summary  Global left ventricular systolic function is normal.  Severe concentric left ventricular hypertrophy.  Systolic anterior motion of anterior mitral leaflet is seen  No significant LVOT obstruction. Mean gradient 4 mm hg.  Normal right ventricular size and function.  Left atrium is moderately dilated.  No significant valvular regurgitation or stenosis seen.  Grade II (moderate) left ventricular diastolic dysfunction.    Stress Test:   not obtained.    Cardiac Angiography:   Not obtained.      ASSESSMENT:  Wide-complex tachycardia after cocaine abuse s/p successful cardioversion 06/25/2024  Hx of wide-complex tachycardia, likely paroxysmal atrial flutter with one-to-one conduction after cocaine abuse  ?  Hypertrophic cardiomyopathy  Hx of polysubstance abuse      RECOMMENDATIONS:  Continue IV amiodarone  Will consult Dr. Anaya  Will obtain transthoracic echo  Replace electrolytes, keep K > 4 and Mg > 2  Will continue to follow      Please wait for final attestation from rounding attending         Alber Beltran MD.  Fellow, cardiovascular disease   [facility], [place], [place].          Attending Physician Statement:    I have discussed the care of  Jaswant Calabrese , including pertinent history and exam findings, with the Cardiology fellow/resident.     I have seen and examined the patient and the key elements of all parts of the encounter have

## 2024-06-25 NOTE — ED NOTES
Resident and physician requesting SW speak to pt. 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     SW spoke with pt about his concerns further. Pt verbalized that he just got back from working when he had to call 911 so his equipment is outside in the front lawn and his 3 dogs have been outside. Pt very anxious about his dogs and his equipment being stolen. Pt denies having anyone that is able to go to his home due to his history of ZAIDA. Pt verbalized that he would \"even come back\" after he leaves to take care of his animals and equipment. SW and pt discussed possibility of calling someone for assistance but pt stated to SW \"the police can't do anything. They'll get there and call the  because I have 2 dangerous dogs who have bit in the past. Then they won't even be able to move the equipment.\" Pt still wishing to be discharged at this time.     Updated physician on conversation with pt.
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
## 2024-06-28 LAB
EKG ATRIAL RATE: 110 BPM
EKG ATRIAL RATE: 230 BPM
EKG ATRIAL RATE: 31 BPM
EKG P AXIS: 66 DEGREES
EKG P AXIS: 78 DEGREES
EKG P-R INTERVAL: 180 MS
EKG Q-T INTERVAL: 318 MS
EKG Q-T INTERVAL: 348 MS
EKG Q-T INTERVAL: 352 MS
EKG QRS DURATION: 102 MS
EKG QRS DURATION: 108 MS
EKG QRS DURATION: 236 MS
EKG QTC CALCULATION (BAZETT): 476 MS
EKG QTC CALCULATION (BAZETT): 481 MS
EKG QTC CALCULATION (BAZETT): 592 MS
EKG R AXIS: -2 DEGREES
EKG R AXIS: -34 DEGREES
EKG R AXIS: 76 DEGREES
EKG T AXIS: 63 DEGREES
EKG T AXIS: 90 DEGREES
EKG T AXIS: 93 DEGREES
EKG VENTRICULAR RATE: 110 BPM
EKG VENTRICULAR RATE: 115 BPM
EKG VENTRICULAR RATE: 208 BPM

## 2024-07-29 ENCOUNTER — OFFICE VISIT (OUTPATIENT)
Dept: INTERNAL MEDICINE | Age: 39
End: 2024-07-29
Payer: COMMERCIAL

## 2024-07-29 ENCOUNTER — HOSPITAL ENCOUNTER (OUTPATIENT)
Age: 39
Setting detail: SPECIMEN
Discharge: HOME | End: 2024-07-29
Payer: COMMERCIAL

## 2024-07-29 VITALS
SYSTOLIC BLOOD PRESSURE: 121 MMHG | BODY MASS INDEX: 29.94 KG/M2 | HEIGHT: 78 IN | WEIGHT: 258.8 LBS | TEMPERATURE: 97.1 F | HEART RATE: 83 BPM | OXYGEN SATURATION: 97 % | DIASTOLIC BLOOD PRESSURE: 73 MMHG

## 2024-07-29 DIAGNOSIS — Z23 NEED FOR TDAP VACCINATION: ICD-10-CM

## 2024-07-29 DIAGNOSIS — J45.20 MILD INTERMITTENT ASTHMA WITHOUT COMPLICATION: Primary | ICD-10-CM

## 2024-07-29 DIAGNOSIS — I48.4 ATYPICAL ATRIAL FLUTTER (HCC): ICD-10-CM

## 2024-07-29 DIAGNOSIS — Z23 NEED FOR HEPATITIS B VACCINATION: ICD-10-CM

## 2024-07-29 DIAGNOSIS — Z11.4 ENCOUNTER FOR SCREENING FOR HIV: ICD-10-CM

## 2024-07-29 DIAGNOSIS — R03.0 ELEVATED BLOOD PRESSURE READING WITHOUT DIAGNOSIS OF HYPERTENSION: ICD-10-CM

## 2024-07-29 DIAGNOSIS — R79.89 ELEVATED SERUM CREATININE: ICD-10-CM

## 2024-07-29 PROBLEM — R00.0 WIDE-COMPLEX TACHYCARDIA: Status: RESOLVED | Noted: 2023-07-14 | Resolved: 2024-07-29

## 2024-07-29 PROBLEM — M79.672 LEFT FOOT PAIN: Status: RESOLVED | Noted: 2024-06-19 | Resolved: 2024-07-29

## 2024-07-29 PROBLEM — I51.7 HYPERTROPHIC CARDIOMEGALY: Status: RESOLVED | Noted: 2024-06-25 | Resolved: 2024-07-29

## 2024-07-29 LAB
ANION GAP SERPL CALCULATED.3IONS-SCNC: 9 MMOL/L (ref 9–16)
BUN SERPL-MCNC: 13 MG/DL (ref 6–20)
CALCIUM SERPL-MCNC: 9.3 MG/DL (ref 8.6–10.4)
CHLORIDE SERPL-SCNC: 104 MMOL/L (ref 98–107)
CO2 SERPL-SCNC: 25 MMOL/L (ref 20–31)
CREAT SERPL-MCNC: 1.4 MG/DL (ref 0.7–1.2)
GFR, ESTIMATED: 68 ML/MIN/1.73M2
GLUCOSE SERPL-MCNC: 92 MG/DL (ref 74–99)
HIV 1+2 AB+HIV1 P24 AG SERPL QL IA: NONREACTIVE
POTASSIUM SERPL-SCNC: 5.2 MMOL/L (ref 3.7–5.3)
SODIUM SERPL-SCNC: 138 MMOL/L (ref 136–145)

## 2024-07-29 PROCEDURE — 90715 TDAP VACCINE 7 YRS/> IM: CPT

## 2024-07-29 PROCEDURE — 90746 HEPB VACCINE 3 DOSE ADULT IM: CPT

## 2024-07-29 PROCEDURE — 80048 BASIC METABOLIC PNL TOTAL CA: CPT

## 2024-07-29 PROCEDURE — 99213 OFFICE O/P EST LOW 20 MIN: CPT

## 2024-07-29 PROCEDURE — 36415 COLL VENOUS BLD VENIPUNCTURE: CPT

## 2024-07-29 PROCEDURE — 87389 HIV-1 AG W/HIV-1&-2 AB AG IA: CPT

## 2024-07-29 RX ORDER — ALBUTEROL SULFATE 2.5 MG/3ML
2.5 SOLUTION RESPIRATORY (INHALATION) 4 TIMES DAILY PRN
Qty: 120 EACH | Refills: 3 | Status: SHIPPED | OUTPATIENT
Start: 2024-07-29

## 2024-07-29 RX ORDER — ALBUTEROL SULFATE 90 UG/1
1-2 AEROSOL, METERED RESPIRATORY (INHALATION) EVERY 4 HOURS PRN
Qty: 1 EACH | Refills: 1 | Status: SHIPPED | OUTPATIENT
Start: 2024-07-29

## 2024-07-29 RX ORDER — METOPROLOL SUCCINATE 25 MG/1
25 TABLET, EXTENDED RELEASE ORAL NIGHTLY
Qty: 30 TABLET | Refills: 3 | Status: SHIPPED | OUTPATIENT
Start: 2024-07-29

## 2024-07-29 RX ORDER — ASPIRIN 81 MG/1
81 TABLET, CHEWABLE ORAL DAILY
Qty: 30 TABLET | Refills: 0 | Status: SHIPPED | OUTPATIENT
Start: 2024-07-29 | End: 2024-08-28

## 2024-07-29 ASSESSMENT — PATIENT HEALTH QUESTIONNAIRE - PHQ9
1. LITTLE INTEREST OR PLEASURE IN DOING THINGS: NOT AT ALL
SUM OF ALL RESPONSES TO PHQ QUESTIONS 1-9: 0
SUM OF ALL RESPONSES TO PHQ QUESTIONS 1-9: 0
SUM OF ALL RESPONSES TO PHQ9 QUESTIONS 1 & 2: 0
SUM OF ALL RESPONSES TO PHQ QUESTIONS 1-9: 0
SUM OF ALL RESPONSES TO PHQ QUESTIONS 1-9: 0
2. FEELING DOWN, DEPRESSED OR HOPELESS: NOT AT ALL

## 2024-07-29 NOTE — PROGRESS NOTES
Attending Physician Statement  I have discussed the care of Jaswant Calabrese, including pertinent history and exam findings with the resident. I have reviewed the key elements of all parts of the encounter with the resident. I have seen and examined the patient with the resident and the key elements of all parts of the encounter have been performed by me.  I agree with the assessment, and status of the problem list as documented. The plan and orders should include   Orders Placed This Encounter   Procedures    Tdap, BOOSTRIX, (age 10 yrs+), IM    Hep B, ENGERIX-B, (age 20 yrs+), IM, 1mL, 3-dose    HIV Screen    Basic Metabolic Panel    and this was also documented by the resident. The medication list was reviewed with the resident and is up to date. The return visit should be in 4 weeks .    Yury Trinidad MD  Attending Physician,  Department of Internal Medicine  [facility] Internal Medicine  [facility]      7/29/2024, 3:33 PM

## 2024-07-29 NOTE — PROGRESS NOTES
[facility]  [facility] [id number] [place] [address]  Dept: [phone]  Dept Fax: [phone]    Office Progress/Follow Up Note  Date of patient's visit: 7/29/2024  Patient's Name:  Jaswant Calabrese YOB: 1985            Patient Care Team:  Apolinar Tavera MD as PCP - General (Internal Medicine)  ________________________________________________________________________      Reason for Visit: Routine outpatient follow up  ________________________________________________________________________  Chief Complaint:  Cardiomyopathy (Pt states is do good), Hypertension (Pt want a b/p cuff, pt took medication today), Referral - General (Pulmonary/), and COPD (Pt need nebulizer  medication)    ________________________________________________________________________  History of Presenting Illness:  History was obtained from: patient, electronic medical record. Jaswant Calabrese is a 38 y.o. is here for a routine outpatient follow-up.    He has known history of hypertrophic cardiomyopathy, substance use including cocaine, history of V. tach, asthma, charted history of hypertension, smoking.     History of wide-complex tachycardia  Last echocardiogram 7/2023 showing EF 55 to 60%, severe concentric LV hypertrophy, slightly increased LVOT velocity, grade 2 diastolic dysfunction.  Patient is supposed to wear external defibrillator and does have follow-up with cardiology, he will need to be 3 months clear of illicit substances before and ICD is placed.  The patient has since had another episode of V. tach, was recently seen in the emergency department in June 2024 and left AGAINST MEDICAL ADVICE.    The patient is off blood pressure medications and his blood pressure on office visits has been better controlled, elevated during times of acute stress when he goes to the emergency department.  The patient has previously been recommended to measure his blood pressures at home

## 2024-07-30 DIAGNOSIS — N18.2 STAGE 2 CHRONIC KIDNEY DISEASE: Primary | ICD-10-CM

## 2024-08-09 ENCOUNTER — HOSPITAL ENCOUNTER (OUTPATIENT)
Dept: ULTRASOUND IMAGING | Age: 39
Discharge: HOME | End: 2024-08-09
Payer: COMMERCIAL

## 2024-08-09 DIAGNOSIS — N18.2 STAGE 2 CHRONIC KIDNEY DISEASE: ICD-10-CM

## 2024-08-09 PROCEDURE — 76770 US EXAM ABDO BACK WALL COMP: CPT

## 2024-09-12 ENCOUNTER — HOSPITAL ENCOUNTER (EMERGENCY)
Age: 39
Discharge: HOME | End: 2024-09-12
Attending: STUDENT IN AN ORGANIZED HEALTH CARE EDUCATION/TRAINING PROGRAM
Payer: COMMERCIAL

## 2024-09-12 ENCOUNTER — APPOINTMENT (OUTPATIENT)
Dept: GENERAL RADIOLOGY | Age: 39
End: 2024-09-12
Payer: COMMERCIAL

## 2024-09-12 VITALS
TEMPERATURE: 98.8 F | BODY MASS INDEX: 30.05 KG/M2 | OXYGEN SATURATION: 97 % | RESPIRATION RATE: 14 BRPM | SYSTOLIC BLOOD PRESSURE: 160 MMHG | HEART RATE: 86 BPM | WEIGHT: 260 LBS | DIASTOLIC BLOOD PRESSURE: 87 MMHG

## 2024-09-12 DIAGNOSIS — R10.13 EPIGASTRIC PAIN: Primary | ICD-10-CM

## 2024-09-12 LAB
ALBUMIN SERPL-MCNC: 4.1 G/DL (ref 3.5–5.2)
ALBUMIN/GLOB SERPL: 1 {RATIO} (ref 1–2.5)
ALP SERPL-CCNC: 76 U/L (ref 40–129)
ALT SERPL-CCNC: 20 U/L (ref 10–50)
ANION GAP SERPL CALCULATED.3IONS-SCNC: 11 MMOL/L (ref 9–16)
AST SERPL-CCNC: 31 U/L (ref 10–50)
BASOPHILS # BLD: 0.04 K/UL (ref 0–0.2)
BASOPHILS NFR BLD: 0 % (ref 0–2)
BILIRUB SERPL-MCNC: 0.6 MG/DL (ref 0–1.2)
BUN SERPL-MCNC: 12 MG/DL (ref 6–20)
CALCIUM SERPL-MCNC: 8.7 MG/DL (ref 8.6–10.4)
CHLORIDE SERPL-SCNC: 107 MMOL/L (ref 98–107)
CO2 SERPL-SCNC: 21 MMOL/L (ref 20–31)
CREAT SERPL-MCNC: 1.2 MG/DL (ref 0.7–1.2)
EKG ATRIAL RATE: 84 BPM
EKG P AXIS: 67 DEGREES
EKG P-R INTERVAL: 160 MS
EKG Q-T INTERVAL: 412 MS
EKG QRS DURATION: 98 MS
EKG QTC CALCULATION (BAZETT): 486 MS
EKG R AXIS: 8 DEGREES
EKG T AXIS: 78 DEGREES
EKG VENTRICULAR RATE: 84 BPM
EOSINOPHIL # BLD: 0.25 K/UL (ref 0–0.44)
EOSINOPHILS RELATIVE PERCENT: 3 % (ref 1–4)
ERYTHROCYTE [DISTWIDTH] IN BLOOD BY AUTOMATED COUNT: 12.9 % (ref 11.8–14.4)
GFR, ESTIMATED: 79 ML/MIN/1.73M2
GLUCOSE SERPL-MCNC: 136 MG/DL (ref 74–99)
HCT VFR BLD AUTO: 46.9 % (ref 40.7–50.3)
HGB BLD-MCNC: 15.6 G/DL (ref 13–17)
IMM GRANULOCYTES # BLD AUTO: 0.03 K/UL (ref 0–0.3)
IMM GRANULOCYTES NFR BLD: 0 %
LIPASE SERPL-CCNC: 19 U/L (ref 13–60)
LYMPHOCYTES NFR BLD: 1.79 K/UL (ref 1.1–3.7)
LYMPHOCYTES RELATIVE PERCENT: 18 % (ref 24–43)
MCH RBC QN AUTO: 30.3 PG (ref 25.2–33.5)
MCHC RBC AUTO-ENTMCNC: 33.3 G/DL (ref 28.4–34.8)
MCV RBC AUTO: 91.1 FL (ref 82.6–102.9)
MONOCYTES NFR BLD: 0.72 K/UL (ref 0.1–1.2)
MONOCYTES NFR BLD: 7 % (ref 3–12)
NEUTROPHILS NFR BLD: 72 % (ref 36–65)
NEUTS SEG NFR BLD: 6.88 K/UL (ref 1.5–8.1)
NRBC BLD-RTO: 0 PER 100 WBC
PLATELET # BLD AUTO: 180 K/UL (ref 138–453)
PMV BLD AUTO: 11.9 FL (ref 8.1–13.5)
POTASSIUM SERPL-SCNC: 4.1 MMOL/L (ref 3.7–5.3)
PROT SERPL-MCNC: 7 G/DL (ref 6.6–8.7)
RBC # BLD AUTO: 5.15 M/UL (ref 4.21–5.77)
SODIUM SERPL-SCNC: 139 MMOL/L (ref 136–145)
TROPONIN I SERPL HS-MCNC: 22 NG/L (ref 0–22)
TROPONIN I SERPL HS-MCNC: 23 NG/L (ref 0–22)
WBC OTHER # BLD: 9.7 K/UL (ref 3.5–11.3)

## 2024-09-12 PROCEDURE — 6370000000 HC RX 637 (ALT 250 FOR IP): Performed by: STUDENT IN AN ORGANIZED HEALTH CARE EDUCATION/TRAINING PROGRAM

## 2024-09-12 PROCEDURE — 85025 COMPLETE CBC W/AUTO DIFF WBC: CPT

## 2024-09-12 PROCEDURE — 2580000003 HC RX 258: Performed by: STUDENT IN AN ORGANIZED HEALTH CARE EDUCATION/TRAINING PROGRAM

## 2024-09-12 PROCEDURE — 71045 X-RAY EXAM CHEST 1 VIEW: CPT

## 2024-09-12 PROCEDURE — 6360000002 HC RX W HCPCS

## 2024-09-12 PROCEDURE — 96375 TX/PRO/DX INJ NEW DRUG ADDON: CPT

## 2024-09-12 PROCEDURE — 84484 ASSAY OF TROPONIN QUANT: CPT

## 2024-09-12 PROCEDURE — 2580000003 HC RX 258

## 2024-09-12 PROCEDURE — 83690 ASSAY OF LIPASE: CPT

## 2024-09-12 PROCEDURE — 6370000000 HC RX 637 (ALT 250 FOR IP)

## 2024-09-12 PROCEDURE — 94761 N-INVAS EAR/PLS OXIMETRY MLT: CPT

## 2024-09-12 PROCEDURE — 99285 EMERGENCY DEPT VISIT HI MDM: CPT

## 2024-09-12 PROCEDURE — 2500000003 HC RX 250 WO HCPCS: Performed by: STUDENT IN AN ORGANIZED HEALTH CARE EDUCATION/TRAINING PROGRAM

## 2024-09-12 PROCEDURE — 96374 THER/PROPH/DIAG INJ IV PUSH: CPT

## 2024-09-12 PROCEDURE — 80053 COMPREHEN METABOLIC PANEL: CPT

## 2024-09-12 PROCEDURE — 94640 AIRWAY INHALATION TREATMENT: CPT

## 2024-09-12 RX ORDER — IPRATROPIUM BROMIDE AND ALBUTEROL SULFATE 2.5; .5 MG/3ML; MG/3ML
1 SOLUTION RESPIRATORY (INHALATION) EVERY 4 HOURS PRN
Status: DISCONTINUED | OUTPATIENT
Start: 2024-09-12 | End: 2024-09-12 | Stop reason: HOSPADM

## 2024-09-12 RX ORDER — ASPIRIN 81 MG/1
324 TABLET, CHEWABLE ORAL ONCE
Status: DISCONTINUED | OUTPATIENT
Start: 2024-09-12 | End: 2024-09-12 | Stop reason: HOSPADM

## 2024-09-12 RX ORDER — MAGNESIUM HYDROXIDE/ALUMINUM HYDROXICE/SIMETHICONE 120; 1200; 1200 MG/30ML; MG/30ML; MG/30ML
30 SUSPENSION ORAL ONCE
Status: COMPLETED | OUTPATIENT
Start: 2024-09-12 | End: 2024-09-12

## 2024-09-12 RX ADMIN — IPRATROPIUM BROMIDE AND ALBUTEROL SULFATE 1 DOSE: .5; 2.5 SOLUTION RESPIRATORY (INHALATION) at 12:10

## 2024-09-12 RX ADMIN — SODIUM CHLORIDE 40 MG: 9 INJECTION INTRAMUSCULAR; INTRAVENOUS; SUBCUTANEOUS at 11:45

## 2024-09-12 RX ADMIN — ALUMINUM HYDROXIDE, MAGNESIUM HYDROXIDE, AND SIMETHICONE 30 ML: 1200; 120; 1200 SUSPENSION ORAL at 12:00

## 2024-09-12 RX ADMIN — FAMOTIDINE 20 MG: 10 INJECTION, SOLUTION INTRAVENOUS at 12:00

## 2024-09-12 ASSESSMENT — ENCOUNTER SYMPTOMS
RESPIRATORY NEGATIVE: 1
ALLERGIC/IMMUNOLOGIC NEGATIVE: 1
ABDOMINAL PAIN: 1
EYES NEGATIVE: 1

## 2024-09-12 ASSESSMENT — PAIN - FUNCTIONAL ASSESSMENT: PAIN_FUNCTIONAL_ASSESSMENT: NONE - DENIES PAIN

## 2024-09-12 ASSESSMENT — HEART SCORE: ECG: NORMAL

## 2024-09-16 LAB
EKG ATRIAL RATE: 84 BPM
EKG P AXIS: 67 DEGREES
EKG P-R INTERVAL: 160 MS
EKG Q-T INTERVAL: 412 MS
EKG QRS DURATION: 98 MS
EKG QTC CALCULATION (BAZETT): 486 MS
EKG R AXIS: 8 DEGREES
EKG T AXIS: 78 DEGREES
EKG VENTRICULAR RATE: 84 BPM

## 2024-09-24 DIAGNOSIS — I48.4 ATYPICAL ATRIAL FLUTTER (HCC): ICD-10-CM

## 2024-09-24 RX ORDER — ASPIRIN 81 MG
TABLET,CHEWABLE ORAL
Qty: 30 TABLET | Refills: 0 | Status: SHIPPED | OUTPATIENT
Start: 2024-09-24

## 2024-11-29 PROBLEM — I42.2 HYPERTROPHIC CARDIOMYOPATHY (HCC): Status: ACTIVE | Noted: 2019-05-15

## 2024-11-29 PROBLEM — F12.90 MARIJUANA USE: Status: ACTIVE | Noted: 2019-08-15

## 2024-12-02 ENCOUNTER — OFFICE VISIT (OUTPATIENT)
Dept: INTERNAL MEDICINE | Age: 39
End: 2024-12-02
Payer: COMMERCIAL

## 2024-12-02 VITALS
TEMPERATURE: 97.2 F | DIASTOLIC BLOOD PRESSURE: 78 MMHG | SYSTOLIC BLOOD PRESSURE: 134 MMHG | HEIGHT: 78 IN | OXYGEN SATURATION: 98 % | BODY MASS INDEX: 32.63 KG/M2 | HEART RATE: 80 BPM | WEIGHT: 282 LBS

## 2024-12-02 DIAGNOSIS — I48.4 ATYPICAL ATRIAL FLUTTER (HCC): ICD-10-CM

## 2024-12-02 DIAGNOSIS — R94.31 PROLONGED Q-T INTERVAL ON ECG: ICD-10-CM

## 2024-12-02 DIAGNOSIS — N52.9 ERECTILE DYSFUNCTION, UNSPECIFIED ERECTILE DYSFUNCTION TYPE: ICD-10-CM

## 2024-12-02 DIAGNOSIS — Z23 NEED FOR HEPATITIS B VACCINATION: Primary | ICD-10-CM

## 2024-12-02 DIAGNOSIS — J45.20 MILD INTERMITTENT ASTHMA WITHOUT COMPLICATION: ICD-10-CM

## 2024-12-02 DIAGNOSIS — R63.1 POLYDIPSIA: ICD-10-CM

## 2024-12-02 DIAGNOSIS — I42.2 HYPERTROPHIC CARDIOMYOPATHY (HCC): ICD-10-CM

## 2024-12-02 DIAGNOSIS — K21.9 GASTROESOPHAGEAL REFLUX DISEASE, UNSPECIFIED WHETHER ESOPHAGITIS PRESENT: ICD-10-CM

## 2024-12-02 DIAGNOSIS — Z91.89 AT RISK FOR HYPOGLYCEMIA: ICD-10-CM

## 2024-12-02 PROCEDURE — 90746 HEPB VACCINE 3 DOSE ADULT IM: CPT

## 2024-12-02 RX ORDER — METOPROLOL SUCCINATE 25 MG/1
25 TABLET, EXTENDED RELEASE ORAL NIGHTLY
Qty: 30 TABLET | Refills: 5 | Status: SHIPPED | OUTPATIENT
Start: 2024-12-02

## 2024-12-02 RX ORDER — CALCIUM CARBONATE 500 MG/1
1 TABLET, CHEWABLE ORAL DAILY
Qty: 30 TABLET | Refills: 0 | Status: SHIPPED | OUTPATIENT
Start: 2024-12-02 | End: 2025-01-01

## 2024-12-02 RX ORDER — ALBUTEROL SULFATE 90 UG/1
1-2 INHALANT RESPIRATORY (INHALATION) EVERY 4 HOURS PRN
Qty: 1 EACH | Refills: 5 | Status: SHIPPED | OUTPATIENT
Start: 2024-12-02

## 2024-12-02 RX ORDER — TADALAFIL 2.5 MG/1
2.5 TABLET ORAL DAILY PRN
Qty: 30 TABLET | Refills: 0 | Status: SHIPPED | OUTPATIENT
Start: 2024-12-02

## 2024-12-02 RX ORDER — ASPIRIN 81 MG/1
81 TABLET, CHEWABLE ORAL DAILY
Qty: 30 TABLET | Refills: 5 | Status: SHIPPED | OUTPATIENT
Start: 2024-12-02

## 2024-12-02 NOTE — PROGRESS NOTES
Attending Physician Statement  I have discussed the care of Jaswant Calabrese, including pertinent history and exam findings, with the resident. I have seen and examined the patient and the key elements of all parts of the encounter have been performed by me.  I agree with the assessment, plan and orders as documented by the resident.  (GC Modifier)    MD MAGGIE Vargas  Attending Physician  Internal Medicine Residency Program, Nephrology   [facility]  12/2/2024, 3:16 PM  
perform.    Patient Active Problem List   Diagnosis    Cocaine abuse (HCC)    Asthma    Cigarette smoker since last 15 years    Mild intermittent asthma without complication    Prolonged Q-T interval on ECG    Acute stress reaction    Elevated blood pressure reading without diagnosis of hypertension    Onychomycosis    Atypical atrial flutter (HCC)    Hypertrophic cardiomyopathy (HCC)    Marijuana use       Health Maintenance Due   Topic Date Due    Pneumococcal 0-64 years Vaccine (1 of 2 - PCV) Never done    Varicella vaccine (1 of 2 - 13+ 2-dose series) Never done    Annual Wellness Visit (Medicare Advantage)  Never done    Flu vaccine (1) Never done    COVID-19 Vaccine (1 - 2023-24 season) Never done       Allergies   Allergen Reactions    Epinephrine Other (See Comments)      been told by cardiologist as a child that he should never have it.     Likely due to hypertrophic cardiomyopathy    Niacin And Related      Never had, been told to never take it by doctor.     Seasonal          Current Outpatient Medications   Medication Sig Dispense Refill    beclomethasone (QVAR REDIHALER) 40 MCG/ACT AERB inhaler Inhale 1 puff into the lungs in the morning and 1 puff in the evening. 10.6 g 5    aspirin (ASPIRIN LOW DOSE) 81 MG chewable tablet Take 1 tablet by mouth daily CHEW AND SWALLOW ONE TABLET BY MOUTH ONCE A DAY 30 tablet 5    albuterol sulfate HFA (PROVENTIL HFA) 108 (90 Base) MCG/ACT inhaler Inhale 1-2 puffs into the lungs every 4 hours as needed for Wheezing or Shortness of Breath Space out to every 6 hours as symptoms improve. 1 each 5    metoprolol succinate (TOPROL XL) 25 MG extended release tablet Take 1 tablet by mouth nightly 30 tablet 5    Tadalafil 2.5 MG TABS Take 2.5 mg by mouth daily as needed (1 hour before sexual activity) 30 tablet 0    calcium carbonate (ANTACID) 500 MG chewable tablet Take 1 tablet by mouth daily 30 tablet 0    verapamil (CALAN SR) 120 MG extended release tablet Take 1 tablet by

## 2024-12-03 DIAGNOSIS — J45.20 MILD INTERMITTENT ASTHMA WITHOUT COMPLICATION: ICD-10-CM

## 2024-12-03 NOTE — TELEPHONE ENCOUNTER
Faxed notification of quantity limit exceeding plan limitations for Qvar RediHaler received.    Pt's insurance prefers brand name, script updated.

## 2024-12-04 RX ORDER — BECLOMETHASONE DIPROPIONATE HFA 40 UG/1
1 AEROSOL, METERED RESPIRATORY (INHALATION)
Qty: 10.6 G | Refills: 5 | Status: SHIPPED | OUTPATIENT
Start: 2024-12-04

## 2025-02-24 ENCOUNTER — INITIAL CONSULT (OUTPATIENT)
Age: 40
End: 2025-02-24
Payer: COMMERCIAL

## 2025-02-24 VITALS
HEART RATE: 92 BPM | SYSTOLIC BLOOD PRESSURE: 118 MMHG | OXYGEN SATURATION: 94 % | WEIGHT: 279 LBS | BODY MASS INDEX: 32.25 KG/M2 | DIASTOLIC BLOOD PRESSURE: 77 MMHG

## 2025-02-24 DIAGNOSIS — F14.10 COCAINE ABUSE (HCC): ICD-10-CM

## 2025-02-24 DIAGNOSIS — I42.8 OBSTRUCTIVE CARDIOMYOPATHY (HCC): ICD-10-CM

## 2025-02-24 DIAGNOSIS — I47.20 VT (VENTRICULAR TACHYCARDIA) (HCC): Primary | ICD-10-CM

## 2025-02-24 PROCEDURE — 99205 OFFICE O/P NEW HI 60 MIN: CPT | Performed by: SPECIALIST

## 2025-02-24 ASSESSMENT — ENCOUNTER SYMPTOMS
ALLERGIC/IMMUNOLOGIC NEGATIVE: 1
GASTROINTESTINAL NEGATIVE: 1
EYES NEGATIVE: 1
RESPIRATORY NEGATIVE: 1

## 2025-02-24 NOTE — PROGRESS NOTES
[facility] CARDIAC ELECTROPHYSIOLOGY  [address], [address]  [facility]  [address]    Date of Visit:  2025  Patient Name: Jaswant Calabrese   Patient :  1985   Referring By:  Apolinar Tavera MD     CHIEF COMPLAINT/HPI:     Jaswant Calabrese is a 39 y.o. male who presents today for an general visit to be evaluated for the following condition(s):  Chief Complaint   Patient presents with    Consultation     CONSULT - Referred by Dr. Tavera for eval for Vtach, Aflutter, ablation vs AICD-   39 years old gentleman known to have obstructive cardiomyopathy since .  Recently he started to have episodes of palpitation.  Documentation of wide QRS tachycardia was done.  Repeatedly there is documentation of such.  At 1 point he was given vest and the vest was attempting to shock him and he disconnected the vest before he got shocked but he ended up in the emergency room.  The tachycardia is left bundle and inferior axis with QRS duration of 236 ms positive concordance.  In the past he said that it used to be treated with ME IV amiodarone.  At 1 point it required DC shock.    Is convinced that this is because of his cocaine use.  Although he tells me right away that he has been using cocaine since , he was diagnosed to have obstructive cardiomyopathy since  when he had syncopal event playing sports, and he did not experience till recently the wide QRS tachycardia.    He was advised ICD but he refused.    There is no known family history of obstructive cardiomyopathy.  He has 4 kids he tells me that they were all tested negative for obstructive cardiomyopathy.  He is not sure if his brothers and sisters were tested for it but he tells me that he informed them that they need to be checked.  There is no clear family history of premature sudden cardiac death.    No known obstructive coronary artery disease.    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

## 2025-08-04 ENCOUNTER — OFFICE VISIT (OUTPATIENT)
Age: 40
End: 2025-08-04

## 2025-08-04 VITALS
TEMPERATURE: 97.2 F | OXYGEN SATURATION: 98 % | SYSTOLIC BLOOD PRESSURE: 136 MMHG | HEART RATE: 78 BPM | WEIGHT: 287 LBS | HEIGHT: 78 IN | DIASTOLIC BLOOD PRESSURE: 88 MMHG | BODY MASS INDEX: 33.21 KG/M2

## 2025-08-04 DIAGNOSIS — R94.31 PROLONGED Q-T INTERVAL ON ECG: ICD-10-CM

## 2025-08-04 DIAGNOSIS — I48.4 ATYPICAL ATRIAL FLUTTER (HCC): Primary | ICD-10-CM

## 2025-08-04 DIAGNOSIS — I42.2 HYPERTROPHIC CARDIOMYOPATHY (HCC): ICD-10-CM

## 2025-08-04 PROCEDURE — 99212 OFFICE O/P EST SF 10 MIN: CPT

## 2025-08-04 PROCEDURE — 99213 OFFICE O/P EST LOW 20 MIN: CPT

## 2025-08-04 SDOH — ECONOMIC STABILITY: FOOD INSECURITY: WITHIN THE PAST 12 MONTHS, YOU WORRIED THAT YOUR FOOD WOULD RUN OUT BEFORE YOU GOT MONEY TO BUY MORE.: NEVER TRUE

## 2025-08-04 SDOH — ECONOMIC STABILITY: FOOD INSECURITY: WITHIN THE PAST 12 MONTHS, THE FOOD YOU BOUGHT JUST DIDN'T LAST AND YOU DIDN'T HAVE MONEY TO GET MORE.: NEVER TRUE

## 2025-08-04 SDOH — ECONOMIC STABILITY: INCOME INSECURITY: IN THE LAST 12 MONTHS, WAS THERE A TIME WHEN YOU WERE NOT ABLE TO PAY THE MORTGAGE OR RENT ON TIME?: NO

## 2025-08-04 SDOH — ECONOMIC STABILITY: TRANSPORTATION INSECURITY
IN THE PAST 12 MONTHS, HAS THE LACK OF TRANSPORTATION KEPT YOU FROM MEDICAL APPOINTMENTS OR FROM GETTING MEDICATIONS?: NO

## 2025-08-04 SDOH — ECONOMIC STABILITY: TRANSPORTATION INSECURITY
IN THE PAST 12 MONTHS, HAS LACK OF TRANSPORTATION KEPT YOU FROM MEETINGS, WORK, OR FROM GETTING THINGS NEEDED FOR DAILY LIVING?: NO

## 2025-08-04 ASSESSMENT — PATIENT HEALTH QUESTIONNAIRE - PHQ9
SUM OF ALL RESPONSES TO PHQ QUESTIONS 1-9: 0
SUM OF ALL RESPONSES TO PHQ9 QUESTIONS 1 & 2: 0
2. FEELING DOWN, DEPRESSED OR HOPELESS: NOT AT ALL
1. LITTLE INTEREST OR PLEASURE IN DOING THINGS: NOT AT ALL
SUM OF ALL RESPONSES TO PHQ QUESTIONS 1-9: 0
1. LITTLE INTEREST OR PLEASURE IN DOING THINGS: NOT AT ALL
2. FEELING DOWN, DEPRESSED OR HOPELESS: NOT AT ALL

## 2025-08-04 ASSESSMENT — ENCOUNTER SYMPTOMS
ABDOMINAL PAIN: 0
SHORTNESS OF BREATH: 0